# Patient Record
Sex: FEMALE | Race: OTHER | HISPANIC OR LATINO
[De-identification: names, ages, dates, MRNs, and addresses within clinical notes are randomized per-mention and may not be internally consistent; named-entity substitution may affect disease eponyms.]

---

## 2017-04-03 ENCOUNTER — APPOINTMENT (OUTPATIENT)
Dept: MRI IMAGING | Facility: CLINIC | Age: 57
End: 2017-04-03

## 2017-04-03 ENCOUNTER — OUTPATIENT (OUTPATIENT)
Dept: OUTPATIENT SERVICES | Facility: HOSPITAL | Age: 57
LOS: 1 days | End: 2017-04-03
Payer: MEDICAID

## 2017-04-03 DIAGNOSIS — Z98.89 OTHER SPECIFIED POSTPROCEDURAL STATES: Chronic | ICD-10-CM

## 2017-04-03 DIAGNOSIS — I62.9 NONTRAUMATIC INTRACRANIAL HEMORRHAGE, UNSPECIFIED: ICD-10-CM

## 2017-04-03 PROCEDURE — 70551 MRI BRAIN STEM W/O DYE: CPT

## 2017-12-04 ENCOUNTER — OUTPATIENT (OUTPATIENT)
Dept: OUTPATIENT SERVICES | Facility: HOSPITAL | Age: 57
LOS: 1 days | Discharge: HOME | End: 2017-12-04
Payer: MEDICAID

## 2017-12-04 VITALS
OXYGEN SATURATION: 98 % | TEMPERATURE: 98 F | HEART RATE: 81 BPM | RESPIRATION RATE: 81 BRPM | WEIGHT: 147.93 LBS | HEIGHT: 60 IN | SYSTOLIC BLOOD PRESSURE: 162 MMHG | DIASTOLIC BLOOD PRESSURE: 79 MMHG

## 2017-12-04 DIAGNOSIS — Z98.891 HISTORY OF UTERINE SCAR FROM PREVIOUS SURGERY: Chronic | ICD-10-CM

## 2017-12-04 DIAGNOSIS — M75.92 SHOULDER LESION, UNSPECIFIED, LEFT SHOULDER: ICD-10-CM

## 2017-12-04 DIAGNOSIS — Z90.710 ACQUIRED ABSENCE OF BOTH CERVIX AND UTERUS: Chronic | ICD-10-CM

## 2017-12-04 DIAGNOSIS — Z98.89 OTHER SPECIFIED POSTPROCEDURAL STATES: Chronic | ICD-10-CM

## 2017-12-04 LAB
ANION GAP SERPL CALC-SCNC: 5 MMOL/L — SIGNIFICANT CHANGE UP (ref 5–17)
BASOPHILS # BLD AUTO: 0.1 K/UL — SIGNIFICANT CHANGE UP (ref 0–0.2)
BASOPHILS NFR BLD AUTO: 1.4 % — SIGNIFICANT CHANGE UP (ref 0–2)
BUN SERPL-MCNC: 8 MG/DL — SIGNIFICANT CHANGE UP (ref 7–23)
CALCIUM SERPL-MCNC: 9.1 MG/DL — SIGNIFICANT CHANGE UP (ref 8.5–10.1)
CHLORIDE SERPL-SCNC: 106 MMOL/L — SIGNIFICANT CHANGE UP (ref 96–108)
CO2 SERPL-SCNC: 30 MMOL/L — SIGNIFICANT CHANGE UP (ref 22–31)
CREAT SERPL-MCNC: 0.56 MG/DL — SIGNIFICANT CHANGE UP (ref 0.5–1.3)
EOSINOPHIL # BLD AUTO: 0.4 K/UL — SIGNIFICANT CHANGE UP (ref 0–0.5)
EOSINOPHIL NFR BLD AUTO: 7.7 % — HIGH (ref 0–6)
GLUCOSE SERPL-MCNC: 136 MG/DL — HIGH (ref 70–99)
HCT VFR BLD CALC: 39.2 % — SIGNIFICANT CHANGE UP (ref 34.5–45)
HGB BLD-MCNC: 13.1 G/DL — SIGNIFICANT CHANGE UP (ref 11.5–15.5)
LYMPHOCYTES # BLD AUTO: 1.4 K/UL — SIGNIFICANT CHANGE UP (ref 1–3.3)
LYMPHOCYTES # BLD AUTO: 27.4 % — SIGNIFICANT CHANGE UP (ref 13–44)
MCHC RBC-ENTMCNC: 28.6 PG — SIGNIFICANT CHANGE UP (ref 27–34)
MCHC RBC-ENTMCNC: 33.4 GM/DL — SIGNIFICANT CHANGE UP (ref 32–36)
MCV RBC AUTO: 85.9 FL — SIGNIFICANT CHANGE UP (ref 80–100)
MONOCYTES # BLD AUTO: 0.4 K/UL — SIGNIFICANT CHANGE UP (ref 0–0.9)
MONOCYTES NFR BLD AUTO: 7.2 % — SIGNIFICANT CHANGE UP (ref 2–14)
NEUTROPHILS # BLD AUTO: 2.8 K/UL — SIGNIFICANT CHANGE UP (ref 1.8–7.4)
NEUTROPHILS NFR BLD AUTO: 56.2 % — SIGNIFICANT CHANGE UP (ref 43–77)
PLATELET # BLD AUTO: 216 K/UL — SIGNIFICANT CHANGE UP (ref 150–400)
POTASSIUM SERPL-MCNC: 4 MMOL/L — SIGNIFICANT CHANGE UP (ref 3.5–5.3)
POTASSIUM SERPL-SCNC: 4 MMOL/L — SIGNIFICANT CHANGE UP (ref 3.5–5.3)
RBC # BLD: 4.56 M/UL — SIGNIFICANT CHANGE UP (ref 3.8–5.2)
RBC # FLD: 12.2 % — SIGNIFICANT CHANGE UP (ref 10.3–14.5)
SODIUM SERPL-SCNC: 141 MMOL/L — SIGNIFICANT CHANGE UP (ref 135–145)
WBC # BLD: 5 K/UL — SIGNIFICANT CHANGE UP (ref 3.8–10.5)
WBC # FLD AUTO: 5 K/UL — SIGNIFICANT CHANGE UP (ref 3.8–10.5)

## 2017-12-04 PROCEDURE — 93010 ELECTROCARDIOGRAM REPORT: CPT

## 2017-12-04 NOTE — H&P PST ADULT - ASSESSMENT
58 y/o female with left shoulder impingement. Complain of chronic left shoulder pain. Scheduled for left shoulder arthroscopy with Dr. Matta.    Plan  1. Stop all NSAIDS, herbal supplements and vitamins for 7 days.  2. NPO at midnight.  3. Take the following medications (losartan) with small sips of water on the morning of your procedure/surgery.  4. Use EZ sponges as directed

## 2017-12-04 NOTE — H&P PST ADULT - NSANTHOSAYNRD_GEN_A_CORE
No. KALYAN screening performed.  STOP BANG Legend: 0-2 = LOW Risk; 3-4 = INTERMEDIATE Risk; 5-8 = HIGH Risk

## 2017-12-04 NOTE — H&P PST ADULT - HISTORY OF PRESENT ILLNESS
58 y/o female with left shoulder impingement. Complain of chronic left shoulder pain. Here today for PST for left shoulder arthroscopy.

## 2017-12-04 NOTE — H&P PST ADULT - FAMILY HISTORY
Mother  Still living? Unknown  Family history of colon cancer, Age at diagnosis: Age Unknown     Father  Still living? Unknown  Family history of heart disease, Age at diagnosis: Age Unknown

## 2017-12-11 ENCOUNTER — RESULT REVIEW (OUTPATIENT)
Age: 57
End: 2017-12-11

## 2017-12-11 ENCOUNTER — OUTPATIENT (OUTPATIENT)
Dept: OUTPATIENT SERVICES | Facility: HOSPITAL | Age: 57
LOS: 1 days | Discharge: HOME | End: 2017-12-11
Payer: MEDICAID

## 2017-12-11 VITALS
SYSTOLIC BLOOD PRESSURE: 108 MMHG | TEMPERATURE: 98 F | OXYGEN SATURATION: 97 % | HEART RATE: 72 BPM | DIASTOLIC BLOOD PRESSURE: 65 MMHG | RESPIRATION RATE: 15 BRPM

## 2017-12-11 VITALS
RESPIRATION RATE: 15 BRPM | HEIGHT: 60 IN | OXYGEN SATURATION: 99 % | SYSTOLIC BLOOD PRESSURE: 151 MMHG | WEIGHT: 147.71 LBS | TEMPERATURE: 98 F | HEART RATE: 79 BPM | DIASTOLIC BLOOD PRESSURE: 80 MMHG

## 2017-12-11 DIAGNOSIS — Z90.710 ACQUIRED ABSENCE OF BOTH CERVIX AND UTERUS: Chronic | ICD-10-CM

## 2017-12-11 DIAGNOSIS — Z98.89 OTHER SPECIFIED POSTPROCEDURAL STATES: Chronic | ICD-10-CM

## 2017-12-11 DIAGNOSIS — Z98.891 HISTORY OF UTERINE SCAR FROM PREVIOUS SURGERY: Chronic | ICD-10-CM

## 2017-12-11 PROCEDURE — 88304 TISSUE EXAM BY PATHOLOGIST: CPT | Mod: 26

## 2017-12-11 RX ORDER — ONDANSETRON 8 MG/1
4 TABLET, FILM COATED ORAL ONCE
Qty: 0 | Refills: 0 | Status: COMPLETED | OUTPATIENT
Start: 2017-12-11 | End: 2017-12-11

## 2017-12-11 RX ORDER — ONDANSETRON 8 MG/1
4 TABLET, FILM COATED ORAL ONCE
Qty: 0 | Refills: 0 | Status: DISCONTINUED | OUTPATIENT
Start: 2017-12-11 | End: 2017-12-11

## 2017-12-11 RX ORDER — FENTANYL CITRATE 50 UG/ML
50 INJECTION INTRAVENOUS
Qty: 0 | Refills: 0 | Status: DISCONTINUED | OUTPATIENT
Start: 2017-12-11 | End: 2017-12-11

## 2017-12-11 RX ORDER — OXYCODONE HYDROCHLORIDE 5 MG/1
10 TABLET ORAL ONCE
Qty: 0 | Refills: 0 | Status: DISCONTINUED | OUTPATIENT
Start: 2017-12-11 | End: 2017-12-11

## 2017-12-11 RX ORDER — OXYCODONE HYDROCHLORIDE 5 MG/1
5 TABLET ORAL EVERY 4 HOURS
Qty: 0 | Refills: 0 | Status: DISCONTINUED | OUTPATIENT
Start: 2017-12-11 | End: 2017-12-11

## 2017-12-11 RX ORDER — FAMOTIDINE 10 MG/ML
20 INJECTION INTRAVENOUS ONCE
Qty: 0 | Refills: 0 | Status: COMPLETED | OUTPATIENT
Start: 2017-12-11 | End: 2017-12-11

## 2017-12-11 RX ORDER — CELECOXIB 200 MG/1
200 CAPSULE ORAL ONCE
Qty: 0 | Refills: 0 | Status: COMPLETED | OUTPATIENT
Start: 2017-12-11 | End: 2017-12-11

## 2017-12-11 RX ORDER — ACETAMINOPHEN 500 MG
975 TABLET ORAL ONCE
Qty: 0 | Refills: 0 | Status: COMPLETED | OUTPATIENT
Start: 2017-12-11 | End: 2017-12-11

## 2017-12-11 RX ORDER — SODIUM CHLORIDE 9 MG/ML
1000 INJECTION INTRAMUSCULAR; INTRAVENOUS; SUBCUTANEOUS
Qty: 0 | Refills: 0 | Status: DISCONTINUED | OUTPATIENT
Start: 2017-12-11 | End: 2017-12-11

## 2017-12-11 RX ORDER — SODIUM CHLORIDE 9 MG/ML
3 INJECTION INTRAMUSCULAR; INTRAVENOUS; SUBCUTANEOUS EVERY 8 HOURS
Qty: 0 | Refills: 0 | Status: DISCONTINUED | OUTPATIENT
Start: 2017-12-11 | End: 2017-12-11

## 2017-12-11 RX ADMIN — FAMOTIDINE 20 MILLIGRAM(S): 10 INJECTION INTRAVENOUS at 13:21

## 2017-12-11 RX ADMIN — Medication 975 MILLIGRAM(S): at 13:21

## 2017-12-11 RX ADMIN — CELECOXIB 200 MILLIGRAM(S): 200 CAPSULE ORAL at 13:20

## 2017-12-11 RX ADMIN — ONDANSETRON 4 MILLIGRAM(S): 8 TABLET, FILM COATED ORAL at 17:12

## 2017-12-11 NOTE — BRIEF OPERATIVE NOTE - OPERATION/FINDINGS
procedure: left shoudler rotator cuff repair, labral debridement, biceps debridement, chondroplasty, subacromial decompression, acrmoioplasty, distal clavicle excision  see full op note dictatino

## 2017-12-11 NOTE — BRIEF OPERATIVE NOTE - PROCEDURE
<<-----Click on this checkbox to enter Procedure Rotator cuff repair  12/11/2017  left jerri rotator cuff repair, labral debridement, biceps debridement, chondroplasty, subacromial decompression, acrmoioplasty, distal clavicle excision  Active  NOLSON

## 2017-12-11 NOTE — ASU DISCHARGE PLAN (ADULT/PEDIATRIC). - NOTIFY
Pain not relieved by Medications/Fever greater than 101/Numbness, color, or temperature change to extremity/Bleeding that does not stop

## 2017-12-11 NOTE — ASU DISCHARGE PLAN (ADULT/PEDIATRIC). - MEDICATION SUMMARY - MEDICATIONS TO TAKE
I will START or STAY ON the medications listed below when I get home from the hospital:    oxyCODONE-acetaminophen 5 mg-325 mg oral tablet  -- 1 tab(s) by mouth every 4 hours, As Needed -for severe pain MDD:6  -- Caution federal law prohibits the transfer of this drug to any person other  than the person for whom it was prescribed.  May cause drowsiness.  Alcohol may intensify this effect.  Use care when operating dangerous machinery.  This prescription cannot be refilled.  This product contains acetaminophen.  Do not use  with any other product containing acetaminophen to prevent possible liver damage.  Using more of this medication than prescribed may cause serious breathing problems.    -- Indication: For pain    losartan 25 mg oral tablet  -- 1 tab(s) by mouth once a day  -- Indication: For home med    metFORMIN 1000 mg oral tablet  -- 1 tab(s) by mouth 2 times a day  -- Indication: For home med    glipiZIDE 10 mg oral tablet  -- 1 tab(s) by mouth 2 times a day  -- Indication: For home med    Toujeo SoloStar 300 units/mL subcutaneous solution  -- 40 unit(s) subcutaneous once a day (at bedtime)  -- Indication: For home med    atorvastatin 10 mg oral tablet  -- 1 tab(s) by mouth once a day  -- Indication: For home med

## 2017-12-11 NOTE — ASU DISCHARGE PLAN (ADULT/PEDIATRIC). - SPECIAL INSTRUCTIONS
Follow up with Dr Matta in 7-10 days. Call office for appointment. Take medications as prescribed. Keep dressing clean, dry, and intact. Rest, ice, and elevate affected extremity. non weight bearing left upper extremity in shoulder immobilizer

## 2017-12-13 LAB — SURGICAL PATHOLOGY FINAL REPORT - CH: SIGNIFICANT CHANGE UP

## 2017-12-20 DIAGNOSIS — M75.42 IMPINGEMENT SYNDROME OF LEFT SHOULDER: ICD-10-CM

## 2017-12-20 DIAGNOSIS — E55.9 VITAMIN D DEFICIENCY, UNSPECIFIED: ICD-10-CM

## 2017-12-20 DIAGNOSIS — M75.122 COMPLETE ROTATOR CUFF TEAR OR RUPTURE OF LEFT SHOULDER, NOT SPECIFIED AS TRAUMATIC: ICD-10-CM

## 2017-12-20 DIAGNOSIS — M65.812 OTHER SYNOVITIS AND TENOSYNOVITIS, LEFT SHOULDER: ICD-10-CM

## 2017-12-20 DIAGNOSIS — Z90.710 ACQUIRED ABSENCE OF BOTH CERVIX AND UTERUS: ICD-10-CM

## 2017-12-20 DIAGNOSIS — M19.012 PRIMARY OSTEOARTHRITIS, LEFT SHOULDER: ICD-10-CM

## 2017-12-20 DIAGNOSIS — M75.52 BURSITIS OF LEFT SHOULDER: ICD-10-CM

## 2017-12-20 DIAGNOSIS — I10 ESSENTIAL (PRIMARY) HYPERTENSION: ICD-10-CM

## 2017-12-20 DIAGNOSIS — Z79.84 LONG TERM (CURRENT) USE OF ORAL HYPOGLYCEMIC DRUGS: ICD-10-CM

## 2017-12-20 DIAGNOSIS — E11.9 TYPE 2 DIABETES MELLITUS WITHOUT COMPLICATIONS: ICD-10-CM

## 2017-12-20 DIAGNOSIS — Z86.73 PERSONAL HISTORY OF TRANSIENT ISCHEMIC ATTACK (TIA), AND CEREBRAL INFARCTION WITHOUT RESIDUAL DEFICITS: ICD-10-CM

## 2017-12-20 DIAGNOSIS — M11.212 OTHER CHONDROCALCINOSIS, LEFT SHOULDER: ICD-10-CM

## 2017-12-20 DIAGNOSIS — M24.812 OTHER SPECIFIC JOINT DERANGEMENTS OF LEFT SHOULDER, NOT ELSEWHERE CLASSIFIED: ICD-10-CM

## 2017-12-20 DIAGNOSIS — M94.212 CHONDROMALACIA, LEFT SHOULDER: ICD-10-CM

## 2017-12-20 DIAGNOSIS — E02 SUBCLINICAL IODINE-DEFICIENCY HYPOTHYROIDISM: ICD-10-CM

## 2018-07-30 ENCOUNTER — INPATIENT (INPATIENT)
Facility: HOSPITAL | Age: 58
LOS: 0 days | Discharge: HOME | End: 2018-07-31
Attending: INTERNAL MEDICINE | Admitting: INTERNAL MEDICINE
Payer: MEDICAID

## 2018-07-30 VITALS — HEIGHT: 60 IN | WEIGHT: 160.06 LBS

## 2018-07-30 DIAGNOSIS — Z98.89 OTHER SPECIFIED POSTPROCEDURAL STATES: Chronic | ICD-10-CM

## 2018-07-30 DIAGNOSIS — M67.912 UNSPECIFIED DISORDER OF SYNOVIUM AND TENDON, LEFT SHOULDER: Chronic | ICD-10-CM

## 2018-07-30 DIAGNOSIS — M79.1 MYALGIA: ICD-10-CM

## 2018-07-30 DIAGNOSIS — Z90.710 ACQUIRED ABSENCE OF BOTH CERVIX AND UTERUS: Chronic | ICD-10-CM

## 2018-07-30 DIAGNOSIS — Z98.891 HISTORY OF UTERINE SCAR FROM PREVIOUS SURGERY: Chronic | ICD-10-CM

## 2018-07-30 PROBLEM — M75.42 IMPINGEMENT SYNDROME OF LEFT SHOULDER: Chronic | Status: ACTIVE | Noted: 2017-12-04

## 2018-07-30 PROBLEM — I10 ESSENTIAL (PRIMARY) HYPERTENSION: Chronic | Status: ACTIVE | Noted: 2017-12-04

## 2018-07-30 LAB
ALBUMIN SERPL ELPH-MCNC: 3.6 G/DL — SIGNIFICANT CHANGE UP (ref 3.3–5)
ALP SERPL-CCNC: 86 U/L — SIGNIFICANT CHANGE UP (ref 40–120)
ALT FLD-CCNC: 33 U/L — SIGNIFICANT CHANGE UP (ref 12–78)
ANION GAP SERPL CALC-SCNC: 7 MMOL/L — SIGNIFICANT CHANGE UP (ref 5–17)
APTT BLD: 24.9 SEC — LOW (ref 27.5–37.4)
AST SERPL-CCNC: 19 U/L — SIGNIFICANT CHANGE UP (ref 15–37)
BASOPHILS # BLD AUTO: 0.02 K/UL — SIGNIFICANT CHANGE UP (ref 0–0.2)
BASOPHILS NFR BLD AUTO: 0.4 % — SIGNIFICANT CHANGE UP (ref 0–2)
BILIRUB SERPL-MCNC: 0.8 MG/DL — SIGNIFICANT CHANGE UP (ref 0.2–1.2)
BUN SERPL-MCNC: 8 MG/DL — SIGNIFICANT CHANGE UP (ref 7–23)
CALCIUM SERPL-MCNC: 8.9 MG/DL — SIGNIFICANT CHANGE UP (ref 8.5–10.1)
CHLORIDE SERPL-SCNC: 107 MMOL/L — SIGNIFICANT CHANGE UP (ref 96–108)
CK SERPL-CCNC: 298 U/L — HIGH (ref 26–192)
CO2 SERPL-SCNC: 28 MMOL/L — SIGNIFICANT CHANGE UP (ref 22–31)
CREAT SERPL-MCNC: 0.68 MG/DL — SIGNIFICANT CHANGE UP (ref 0.5–1.3)
EOSINOPHIL # BLD AUTO: 0.34 K/UL — SIGNIFICANT CHANGE UP (ref 0–0.5)
EOSINOPHIL NFR BLD AUTO: 6.4 % — HIGH (ref 0–6)
GLUCOSE BLDC GLUCOMTR-MCNC: 141 MG/DL — HIGH (ref 70–99)
GLUCOSE BLDC GLUCOMTR-MCNC: 146 MG/DL — HIGH (ref 70–99)
GLUCOSE BLDC GLUCOMTR-MCNC: 153 MG/DL — HIGH (ref 70–99)
GLUCOSE SERPL-MCNC: 151 MG/DL — HIGH (ref 70–99)
HCT VFR BLD CALC: 35.5 % — SIGNIFICANT CHANGE UP (ref 34.5–45)
HGB BLD-MCNC: 12 G/DL — SIGNIFICANT CHANGE UP (ref 11.5–15.5)
IMM GRANULOCYTES NFR BLD AUTO: 0.4 % — SIGNIFICANT CHANGE UP (ref 0–1.5)
INR BLD: 0.96 RATIO — SIGNIFICANT CHANGE UP (ref 0.88–1.16)
LIDOCAIN IGE QN: 188 U/L — SIGNIFICANT CHANGE UP (ref 73–393)
LYMPHOCYTES # BLD AUTO: 1.61 K/UL — SIGNIFICANT CHANGE UP (ref 1–3.3)
LYMPHOCYTES # BLD AUTO: 30.1 % — SIGNIFICANT CHANGE UP (ref 13–44)
MCHC RBC-ENTMCNC: 29.2 PG — SIGNIFICANT CHANGE UP (ref 27–34)
MCHC RBC-ENTMCNC: 33.8 GM/DL — SIGNIFICANT CHANGE UP (ref 32–36)
MCV RBC AUTO: 86.4 FL — SIGNIFICANT CHANGE UP (ref 80–100)
MONOCYTES # BLD AUTO: 0.33 K/UL — SIGNIFICANT CHANGE UP (ref 0–0.9)
MONOCYTES NFR BLD AUTO: 6.2 % — SIGNIFICANT CHANGE UP (ref 2–14)
NEUTROPHILS # BLD AUTO: 3.03 K/UL — SIGNIFICANT CHANGE UP (ref 1.8–7.4)
NEUTROPHILS NFR BLD AUTO: 56.5 % — SIGNIFICANT CHANGE UP (ref 43–77)
NRBC # BLD: 0 /100 WBCS — SIGNIFICANT CHANGE UP (ref 0–0)
PLATELET # BLD AUTO: 207 K/UL — SIGNIFICANT CHANGE UP (ref 150–400)
POTASSIUM SERPL-MCNC: 4 MMOL/L — SIGNIFICANT CHANGE UP (ref 3.5–5.3)
POTASSIUM SERPL-SCNC: 4 MMOL/L — SIGNIFICANT CHANGE UP (ref 3.5–5.3)
PROT SERPL-MCNC: 7.5 GM/DL — SIGNIFICANT CHANGE UP (ref 6–8.3)
PROTHROM AB SERPL-ACNC: 10.4 SEC — SIGNIFICANT CHANGE UP (ref 9.8–12.7)
RBC # BLD: 4.11 M/UL — SIGNIFICANT CHANGE UP (ref 3.8–5.2)
RBC # FLD: 13.3 % — SIGNIFICANT CHANGE UP (ref 10.3–14.5)
SODIUM SERPL-SCNC: 142 MMOL/L — SIGNIFICANT CHANGE UP (ref 135–145)
TROPONIN I SERPL-MCNC: <0.015 NG/ML — SIGNIFICANT CHANGE UP (ref 0.01–0.04)
WBC # BLD: 5.35 K/UL — SIGNIFICANT CHANGE UP (ref 3.8–10.5)
WBC # FLD AUTO: 5.35 K/UL — SIGNIFICANT CHANGE UP (ref 3.8–10.5)

## 2018-07-30 PROCEDURE — 71045 X-RAY EXAM CHEST 1 VIEW: CPT | Mod: 26

## 2018-07-30 PROCEDURE — 99285 EMERGENCY DEPT VISIT HI MDM: CPT

## 2018-07-30 PROCEDURE — 93010 ELECTROCARDIOGRAM REPORT: CPT

## 2018-07-30 RX ORDER — PANTOPRAZOLE SODIUM 20 MG/1
40 TABLET, DELAYED RELEASE ORAL
Qty: 0 | Refills: 0 | Status: DISCONTINUED | OUTPATIENT
Start: 2018-07-30 | End: 2018-07-31

## 2018-07-30 RX ORDER — METFORMIN HYDROCHLORIDE 850 MG/1
1 TABLET ORAL
Qty: 0 | Refills: 0 | COMMUNITY

## 2018-07-30 RX ORDER — DOCUSATE SODIUM 100 MG
100 CAPSULE ORAL THREE TIMES A DAY
Qty: 0 | Refills: 0 | Status: DISCONTINUED | OUTPATIENT
Start: 2018-07-30 | End: 2018-07-31

## 2018-07-30 RX ORDER — INSULIN GLARGINE 100 [IU]/ML
40 INJECTION, SOLUTION SUBCUTANEOUS AT BEDTIME
Qty: 0 | Refills: 0 | Status: DISCONTINUED | OUTPATIENT
Start: 2018-07-30 | End: 2018-07-31

## 2018-07-30 RX ORDER — DEXTROSE 50 % IN WATER 50 %
15 SYRINGE (ML) INTRAVENOUS ONCE
Qty: 0 | Refills: 0 | Status: DISCONTINUED | OUTPATIENT
Start: 2018-07-30 | End: 2018-07-31

## 2018-07-30 RX ORDER — INSULIN GLARGINE 100 [IU]/ML
40 INJECTION, SOLUTION SUBCUTANEOUS
Qty: 0 | Refills: 0 | COMMUNITY

## 2018-07-30 RX ORDER — LOSARTAN POTASSIUM 100 MG/1
1 TABLET, FILM COATED ORAL
Qty: 0 | Refills: 0 | COMMUNITY

## 2018-07-30 RX ORDER — GLUCAGON INJECTION, SOLUTION 0.5 MG/.1ML
1 INJECTION, SOLUTION SUBCUTANEOUS ONCE
Qty: 0 | Refills: 0 | Status: DISCONTINUED | OUTPATIENT
Start: 2018-07-30 | End: 2018-07-31

## 2018-07-30 RX ORDER — ONDANSETRON 8 MG/1
4 TABLET, FILM COATED ORAL EVERY 6 HOURS
Qty: 0 | Refills: 0 | Status: DISCONTINUED | OUTPATIENT
Start: 2018-07-30 | End: 2018-07-31

## 2018-07-30 RX ORDER — LOSARTAN POTASSIUM 100 MG/1
25 TABLET, FILM COATED ORAL DAILY
Qty: 0 | Refills: 0 | Status: DISCONTINUED | OUTPATIENT
Start: 2018-07-30 | End: 2018-07-31

## 2018-07-30 RX ORDER — ATORVASTATIN CALCIUM 80 MG/1
1 TABLET, FILM COATED ORAL
Qty: 0 | Refills: 0 | COMMUNITY

## 2018-07-30 RX ORDER — ATORVASTATIN CALCIUM 80 MG/1
10 TABLET, FILM COATED ORAL AT BEDTIME
Qty: 0 | Refills: 0 | Status: DISCONTINUED | OUTPATIENT
Start: 2018-07-30 | End: 2018-07-31

## 2018-07-30 RX ORDER — KETOROLAC TROMETHAMINE 30 MG/ML
15 SYRINGE (ML) INJECTION EVERY 6 HOURS
Qty: 0 | Refills: 0 | Status: DISCONTINUED | OUTPATIENT
Start: 2018-07-30 | End: 2018-07-31

## 2018-07-30 RX ORDER — SODIUM CHLORIDE 9 MG/ML
1000 INJECTION, SOLUTION INTRAVENOUS
Qty: 0 | Refills: 0 | Status: DISCONTINUED | OUTPATIENT
Start: 2018-07-30 | End: 2018-07-31

## 2018-07-30 RX ORDER — ENOXAPARIN SODIUM 100 MG/ML
40 INJECTION SUBCUTANEOUS EVERY 24 HOURS
Qty: 0 | Refills: 0 | Status: DISCONTINUED | OUTPATIENT
Start: 2018-07-30 | End: 2018-07-31

## 2018-07-30 RX ADMIN — Medication 15 MILLIGRAM(S): at 17:48

## 2018-07-30 RX ADMIN — Medication 100 MILLIGRAM(S): at 21:47

## 2018-07-30 RX ADMIN — ENOXAPARIN SODIUM 40 MILLIGRAM(S): 100 INJECTION SUBCUTANEOUS at 18:32

## 2018-07-30 RX ADMIN — PANTOPRAZOLE SODIUM 40 MILLIGRAM(S): 20 TABLET, DELAYED RELEASE ORAL at 18:32

## 2018-07-30 RX ADMIN — ATORVASTATIN CALCIUM 10 MILLIGRAM(S): 80 TABLET, FILM COATED ORAL at 21:47

## 2018-07-30 NOTE — H&P ADULT - NSHPLABSRESULTS_GEN_ALL_CORE
12.0   5.35  )-----------( 207      ( 30 Jul 2018 12:29 )             35.5     30 Jul 2018 12:29    142    |  107    |  8      ----------------------------<  151    4.0     |  28     |  0.68     Ca    8.9        30 Jul 2018 12:29    TPro  7.5    /  Alb  3.6    /  TBili  0.8    /  DBili  x      /  AST  19     /  ALT  33     /  AlkPhos  86     30 Jul 2018 12:29    LIVER FUNCTIONS - ( 30 Jul 2018 12:29 )  Alb: 3.6 g/dL / Pro: 7.5 gm/dL / ALK PHOS: 86 U/L / ALT: 33 U/L / AST: 19 U/L / GGT: x           PT/INR - ( 30 Jul 2018 12:29 )   PT: 10.4 sec;   INR: 0.96 ratio       PTT - ( 30 Jul 2018 12:29 )  PTT:24.9 sec    POCT Blood Glucose.: 153 mg/dL (30 Jul 2018 12:27)  CARDIAC MARKERS ( 30 Jul 2018 12:29 )  <0.015 ng/mL / x     / 298 U/L / x     / x

## 2018-07-30 NOTE — ED STATDOCS - OBJECTIVE STATEMENT
57 y/o female with a PMHx of HTN, DM, CVA, HLD presents to the ED c/o CP and SOB x3 days radiating to shoulder. Pt saw PMD today- was given ASA and was sent into ED. SOB is worsened with bending forward. +chills, cough. Denies fever. Cardiologist- Dr. Fofana PMD- Dr. Davila

## 2018-07-30 NOTE — H&P ADULT - NSHPPHYSICALEXAM_GEN_ALL_CORE
Vital Signs Last 24 Hrs  T(C): 37 (30 Jul 2018 11:17), Max: 37 (30 Jul 2018 11:17)  T(F): 98.6 (30 Jul 2018 11:17), Max: 98.6 (30 Jul 2018 11:17)  HR: 65 (30 Jul 2018 11:17) (65 - 65)  BP: 142/76 (30 Jul 2018 11:17) (142/76 - 142/76)  BP(mean): --  RR: 18 (30 Jul 2018 11:17) (18 - 18)  SpO2: 99% (30 Jul 2018 11:17) (99% - 99%)

## 2018-07-30 NOTE — ED STATDOCS - NS_ ATTENDINGSCRIBEDETAILS _ED_A_ED_FT
Darrel Mcclain DO (Attending): The history, relevant review of systems, past medical and surgical history, medical decision making, and physical examination was documented by the scribe in my presence and I attest to the accuracy of the documentation.

## 2018-07-30 NOTE — ED STATDOCS - PMH
Cerebrovascular accident    Diabetes    HLD (hyperlipidemia)    HTN (hypertension)    Shoulder impingement, left

## 2018-07-30 NOTE — ED STATDOCS - PROGRESS NOTE DETAILS
KIMBERLY Johnson:   Patient has been seen, evaluated and orders have been written by the attending in intake. Patient is stable.  I will follow up the results of orders written and I will continue to evaluate/observe the patient.  Pt. with heart score 4, CP and SOB x 3 days.  Admission for cardiology workup.  Leydi Johnson PA-C

## 2018-07-30 NOTE — H&P ADULT - HISTORY OF PRESENT ILLNESS
57yo/F with PMH HTN, hyperlipidemia, diabetes presented for evaluation of chest pain, substernal and across the chest, constant, worse on movement and deep inspiration, radiating to LT shoulder, no SOB, no N/V, no diaphoresis

## 2018-07-30 NOTE — H&P ADULT - PSH
Disorder of left rotator cuff    H/O:  section  2x  H/O: hysterectomy  2003  History of cholecystectomy  25 yrs ago

## 2018-07-30 NOTE — ED STATDOCS - ATTENDING CONTRIBUTION TO CARE
I, Darrel Mcclain DO,  performed the initial face to face bedside interview with this patient regarding history of present illness, review of symptoms and relevant past medical, social and family history.  I completed an independent physical examination.  I was the initial provider who evaluated this patient. I have signed out the follow up of any pending tests (i.e. labs, radiological studies) to the ACP.  I have communicated the patient’s plan of care and disposition with the ACP.

## 2018-07-30 NOTE — H&P ADULT - ASSESSMENT
#Chest pain  Likely atypical/ musculoskeletal  Admit to tele  CEx3  Cardio eval DR Verduzco  Aspirin, statin  ECHO  Toradol for pain prn    #HTN/hyperlipidemia/Diabetes  ISS  Cont home meds    #Dispo- tele admit

## 2018-07-30 NOTE — ED STATDOCS - MEDICAL DECISION MAKING DETAILS
57 y/o female with CP. EKG unremarkable, however hx of DM, HTN, HLD, CVA. Heart score 5 prior to troponin. Plans for labs, EKG, tele monitoring, and admission.

## 2018-07-31 ENCOUNTER — TRANSCRIPTION ENCOUNTER (OUTPATIENT)
Age: 58
End: 2018-07-31

## 2018-07-31 VITALS
HEART RATE: 61 BPM | SYSTOLIC BLOOD PRESSURE: 132 MMHG | TEMPERATURE: 97 F | RESPIRATION RATE: 18 BRPM | OXYGEN SATURATION: 98 % | DIASTOLIC BLOOD PRESSURE: 72 MMHG

## 2018-07-31 DIAGNOSIS — R07.9 CHEST PAIN, UNSPECIFIED: ICD-10-CM

## 2018-07-31 DIAGNOSIS — I10 ESSENTIAL (PRIMARY) HYPERTENSION: ICD-10-CM

## 2018-07-31 LAB
ANION GAP SERPL CALC-SCNC: 6 MMOL/L — SIGNIFICANT CHANGE UP (ref 5–17)
BUN SERPL-MCNC: 17 MG/DL — SIGNIFICANT CHANGE UP (ref 7–23)
CALCIUM SERPL-MCNC: 9.1 MG/DL — SIGNIFICANT CHANGE UP (ref 8.5–10.1)
CHLORIDE SERPL-SCNC: 110 MMOL/L — HIGH (ref 96–108)
CK SERPL-CCNC: 203 U/L — HIGH (ref 26–192)
CO2 SERPL-SCNC: 28 MMOL/L — SIGNIFICANT CHANGE UP (ref 22–31)
CREAT SERPL-MCNC: 0.71 MG/DL — SIGNIFICANT CHANGE UP (ref 0.5–1.3)
GLUCOSE BLDC GLUCOMTR-MCNC: 123 MG/DL — HIGH (ref 70–99)
GLUCOSE BLDC GLUCOMTR-MCNC: 176 MG/DL — HIGH (ref 70–99)
GLUCOSE SERPL-MCNC: 115 MG/DL — HIGH (ref 70–99)
HBA1C BLD-MCNC: 9.5 % — HIGH (ref 4–5.6)
HCT VFR BLD CALC: 38.9 % — SIGNIFICANT CHANGE UP (ref 34.5–45)
HGB BLD-MCNC: 12.8 G/DL — SIGNIFICANT CHANGE UP (ref 11.5–15.5)
MCHC RBC-ENTMCNC: 28.8 PG — SIGNIFICANT CHANGE UP (ref 27–34)
MCHC RBC-ENTMCNC: 32.9 GM/DL — SIGNIFICANT CHANGE UP (ref 32–36)
MCV RBC AUTO: 87.6 FL — SIGNIFICANT CHANGE UP (ref 80–100)
NRBC # BLD: 0 /100 WBCS — SIGNIFICANT CHANGE UP (ref 0–0)
PLATELET # BLD AUTO: 219 K/UL — SIGNIFICANT CHANGE UP (ref 150–400)
POTASSIUM SERPL-MCNC: 4.3 MMOL/L — SIGNIFICANT CHANGE UP (ref 3.5–5.3)
POTASSIUM SERPL-SCNC: 4.3 MMOL/L — SIGNIFICANT CHANGE UP (ref 3.5–5.3)
RBC # BLD: 4.44 M/UL — SIGNIFICANT CHANGE UP (ref 3.8–5.2)
RBC # FLD: 13.3 % — SIGNIFICANT CHANGE UP (ref 10.3–14.5)
SODIUM SERPL-SCNC: 144 MMOL/L — SIGNIFICANT CHANGE UP (ref 135–145)
WBC # BLD: 5.89 K/UL — SIGNIFICANT CHANGE UP (ref 3.8–10.5)
WBC # FLD AUTO: 5.89 K/UL — SIGNIFICANT CHANGE UP (ref 3.8–10.5)

## 2018-07-31 PROCEDURE — 93018 CV STRESS TEST I&R ONLY: CPT

## 2018-07-31 PROCEDURE — 78452 HT MUSCLE IMAGE SPECT MULT: CPT | Mod: 26

## 2018-07-31 PROCEDURE — 99222 1ST HOSP IP/OBS MODERATE 55: CPT

## 2018-07-31 PROCEDURE — 93016 CV STRESS TEST SUPVJ ONLY: CPT

## 2018-07-31 RX ORDER — REGADENOSON 0.08 MG/ML
0.4 INJECTION, SOLUTION INTRAVENOUS ONCE
Qty: 0 | Refills: 0 | Status: COMPLETED | OUTPATIENT
Start: 2018-07-31 | End: 2018-07-31

## 2018-07-31 RX ORDER — ACETAMINOPHEN 500 MG
650 TABLET ORAL ONCE
Qty: 0 | Refills: 0 | Status: COMPLETED | OUTPATIENT
Start: 2018-07-31 | End: 2018-07-31

## 2018-07-31 RX ORDER — ATORVASTATIN CALCIUM 80 MG/1
1 TABLET, FILM COATED ORAL
Qty: 0 | Refills: 0 | DISCHARGE
Start: 2018-07-31

## 2018-07-31 RX ADMIN — LOSARTAN POTASSIUM 25 MILLIGRAM(S): 100 TABLET, FILM COATED ORAL at 06:15

## 2018-07-31 RX ADMIN — Medication 650 MILLIGRAM(S): at 09:30

## 2018-07-31 RX ADMIN — REGADENOSON 0.4 MILLIGRAM(S): 0.08 INJECTION, SOLUTION INTRAVENOUS at 10:00

## 2018-07-31 RX ADMIN — Medication 100 MILLIGRAM(S): at 06:15

## 2018-07-31 RX ADMIN — PANTOPRAZOLE SODIUM 40 MILLIGRAM(S): 20 TABLET, DELAYED RELEASE ORAL at 06:15

## 2018-07-31 NOTE — DISCHARGE NOTE ADULT - CARE PLAN
Principal Discharge DX:	Intercostal pain  Goal:	chest pain free  Assessment and plan of treatment:	pain management with warm or cold compress   follow up with your primary care provider in 1-3 days  Secondary Diagnosis:	Type 2 diabetes mellitus without complication, unspecified whether long term insulin use  Goal:	good glycemic control  Assessment and plan of treatment:	Follow up with your diabetes doctor (endocrinologist) within 1 week   diabetic diet with all meals; keep in mind that some fruits are high in sugar  take your medications as prescribed

## 2018-07-31 NOTE — DISCHARGE NOTE ADULT - MEDICATION SUMMARY - MEDICATIONS TO TAKE
I will START or STAY ON the medications listed below when I get home from the hospital:    losartan 100 mg oral tablet  -- 1 tab(s) by mouth once a day  -- Indication: For Essential hypertension    Touilia SoloStar 300 units/mL subcutaneous solution  -- 20 unit(s) subcutaneous once a day (at bedtime)      ****Patient states between 20 and 25 units every evening***  -- Indication: For Diabetes    glipiZIDE 10 mg oral tablet  -- 1 tab(s) by mouth 2 times a day  -- Indication: For Diabetes    HumaLOG 100 units/mL injectable solution  -- 3 dose(s) injectable 3 times a day (before meals)      *****Sliding Scale*****  -- Indication: For Diabetes    pioglitazone 30 mg oral tablet  -- 1 tab(s) by mouth once a day  -- Indication: For Continue your home medications for diabetes    atorvastatin 10 mg oral tablet  -- 1 tab(s) by mouth once a day (at bedtime)  -- Indication: For Continue your home medicine for cholesterol    polyethylene glycol 3350 oral powder for reconstitution  -- 1 dose(s) by mouth once a day  -- Indication: For Constipation    omeprazole 40 mg oral delayed release capsule  -- 1 cap(s) by mouth once a day  -- Indication: For preventative

## 2018-07-31 NOTE — CONSULT NOTE ADULT - SUBJECTIVE AND OBJECTIVE BOX
CHIEF COMPLAINT: Chest discomfort x several days    HPI:  58 year old woman with a history of DM, HTN, and hyperlipidemia presented to the ED on  with complaints of chest discomfort and SOB x several days.  She describes a mild "tightness" across her chest that is exacerbated by deep inspiration and relieved by rest; symptoms started 2 days prior to presentation; no associated diaphoresis of nausea; occasional radiation towards L shoulder.  She has no known history of heart disease.    PAST MEDICAL & SURGICAL HISTORY:  HLD (hyperlipidemia)  Shoulder impingement, left  HTN (hypertension)  Cerebrovascular accident  Diabetes  Disorder of left rotator cuff  H/O: hysterectomy:   H/O:  section: 2x  History of cholecystectomy: 25 yrs ago    SOCIAL HISTORY:   Alcohol: Denied  Smoking: Nonsmoker    FAMILY HISTORY: FAMILY HISTORY:  Family history of heart disease (Father)  Family history of colon cancer (Mother)    MEDICATIONS  (STANDING):  atorvastatin 10 milliGRAM(s) Oral at bedtime  dextrose 5%. 1000 milliLiter(s) (50 mL/Hr) IV Continuous <Continuous>  docusate sodium 100 milliGRAM(s) Oral three times a day  enoxaparin Injectable 40 milliGRAM(s) SubCutaneous every 24 hours  insulin glargine Injectable (LANTUS) 40 Unit(s) SubCutaneous at bedtime  losartan 25 milliGRAM(s) Oral daily  pantoprazole    Tablet 40 milliGRAM(s) Oral before breakfast    Allergies: No Known Allergies    REVIEW OF SYSTEMS:  CONSTITUTIONAL: No weakness, fevers or chills  Eyes: No visual changes  NECK: No pain or stiffness  RESPIRATORY: No cough, wheezing, hemoptysis; No shortness of breath  CARDIOVASCULAR: + chest pain, no palpitations  GASTROINTESTINAL: No abdominal pain. No nausea, vomiting, or hematemesis; No diarrhea or constipation. No melena or hematochezia.  GENITOURINARY: No dysuria, frequency or hematuria  NEUROLOGICAL: No numbness.  SKIN: No itching or rash  All other review of systems is negative unless indicated above    VITAL SIGNS:   Vital Signs Last 24 Hrs  T(C): 36.2 (2018 04:50), Max: 37 (2018 11:17)  T(F): 97.2 (2018 04:50), Max: 98.6 (2018 11:17)  HR: 61 (2018 04:50) (61 - 80)  BP: 132/72 (2018 04:50) (123/80 - 142/76)  RR: 18 (2018 04:50) (14 - 18)  SpO2: 98% (2018 04:50) (96% - 99%)    PHYSICAL EXAM:  Constitutional: NAD, awake and alert, seated in bedside chair  HEENT:  Pupils round, No oral cyanosis.  Pulmonary: Non-labored, breath sounds are clear bilaterally, No wheezing, rales or rhonchi  Cardiovascular: S1 and S2, regular rate and rhythm  Gastrointestinal: Bowel Sounds present, soft, nontender.   Lymph: No peripheral edema. No cervical lymphadenopathy.  Neurological: Alert, no focal deficits  Skin: No rashes.  Psych:  Mood & affect appropriate    LABS:                      12.8   5.89  )-----------( 219      ( 2018 06:12 )             38.9                      144    |  110    |  17     ----------------------------<  115    4.3     |  28     |  0.71     Ca    9.1        2018 06:12    CARDIAC MARKERS ( 2018 06:12 ) x     / x     / 203 U/L / x     / x      CARDIAC MARKERS ( 2018 19:49 ) <0.015 ng/mL / x     / x     / x     / x      CARDIAC MARKERS ( 2018 15:55 ) <0.015 ng/mL / x     / x     / x     / x      CARDIAC MARKERS ( 2018 12:29 ) <0.015 ng/mL / x     / 298 U/L / x     / x        ECG (18): Normal sinus rhythm    Tele: Sinus rhythm    Xray Chest 1 View AP/PA. (18 @ 12:55):  Heart magnified by AP film shallow inspiration. Grossly clear lungs. No consolidation or effusion.

## 2018-07-31 NOTE — DISCHARGE NOTE ADULT - HOSPITAL COURSE
PATIENT SEEN, CHART REVIEWED, PT SEEN WITH Dr LOZADA    58 year old Woman with PMH HTN, hyperlipidemia, diabetes, left rotator cuff disorder presented for evaluation of chest pain, substernal and across the chest, constant, worse on movement and deep inspiration, radiating to LT shoulder, no SOB, no N/V, no diaphoresis. CE's ECG negative, MPI normal. Pt stable for discharge with outpt f/u with her primary PCP and cardiologist. Pt educated on the post discharge meds, follow up and procedure care. She is to make an alejandro't to f/u with PCP within 1 week. Pt verbalized an understanding of and agreed with discharge plans    VITALS  Vital Signs Last 24 Hrs  T(C): 36.2 (31 Jul 2018 04:50), Max: 36.9 (30 Jul 2018 15:30)  T(F): 97.2 (31 Jul 2018 04:50), Max: 98.4 (30 Jul 2018 15:30)  HR: 61 (31 Jul 2018 04:50) (61 - 80)  BP: 132/72 (31 Jul 2018 04:50) (123/80 - 137/68)  BP(mean): --  RR: 18 (31 Jul 2018 04:50) (14 - 18)  SpO2: 98% (31 Jul 2018 04:50) (96% - 98%)    PHYSICAL EXAM:  Constitutional: NAD, awake and alert, seated in bedside chair  HEENT:  Pupils round, No oral cyanosis.  Pulmonary: Non-labored, breath sounds are clear bilaterally, No wheezing, rales or rhonchi  Cardiovascular: S1 and S2, regular rate and rhythm, +reproducible pain to ACW more to Left side, no crepitus, no bruising noted  Gastrointestinal: Bowel Sounds present, soft, nontender.   Lymph: No peripheral edema. No cervical lymphadenopathy.  Neurological: Alert, no focal deficits  Skin: No rashes.  Psych:  Mood & affect appropriate  TELE: NSR 60's - 70's    A/P  Chest pain -Likely musculoskeletal  - she r/o ACS with negative cardiac biomarkers  - hx of rotator cuff to L shoulder for which she receives PT (?vigorous PT)  - MPI w/o fixed or reversible perfusion defect, no abnormal wall motion; normal LV size and function  - appreciate cardiology input  - con't  home dose statin at discharge  - not on asa d/t hx of hemorraghic CVA  - warm/cold compress for musculoskeletal pain    HTN/hyperlipidemia/Diabetes  Cont home meds  - to f/u with her PCP who manages her diabetes for A1c of 9.5% (prior results unk)    Dispo  - full code  - dc home PATIENT SEEN, CHART REVIEWED, PT SEEN WITH Dr LOZADA    58 year old Woman with PMH HTN, hyperlipidemia, diabetes, left rotator cuff disorder presented for evaluation of chest pain, substernal and across the chest, constant, worse on movement and deep inspiration, radiating to LT shoulder, no SOB, no N/V, no diaphoresis. CE's ECG negative, MPI normal. Pt stable for discharge with outpt f/u with her primary PCP and cardiologist. Pt educated on the post discharge meds, follow up and procedure care. She is to make an alejandro't to f/u with PCP within 1 week. Pt verbalized an understanding of and agreed with discharge plans.    HOSPITAL COURSE  7/31/18; Denies SOB, +ACW tenderness and with movement otherwise no chest pain; no palpitations/HA/dizziness/vision changes/abd pain/n/v/d/f/c; feels ok    VITALS  Vital Signs Last 24 Hrs  T(C): 36.2 (31 Jul 2018 04:50), Max: 36.9 (30 Jul 2018 15:30)  T(F): 97.2 (31 Jul 2018 04:50), Max: 98.4 (30 Jul 2018 15:30)  HR: 61 (31 Jul 2018 04:50) (61 - 80)  BP: 132/72 (31 Jul 2018 04:50) (123/80 - 137/68)  BP(mean): --  RR: 18 (31 Jul 2018 04:50) (14 - 18)  SpO2: 98% (31 Jul 2018 04:50) (96% - 98%)    PHYSICAL EXAM:  Constitutional: NAD, awake and alert, seated in bedside chair  HEENT:  Pupils round, No oral cyanosis.  Pulmonary: Non-labored, breath sounds are clear bilaterally, No wheezing, rales or rhonchi  Cardiovascular: S1 and S2, regular rate and rhythm, +reproducible pain to ACW more to Left side, no crepitus, no bruising noted  Gastrointestinal: Bowel Sounds present, soft, nontender.   Lymph: No peripheral edema. No cervical lymphadenopathy.  Neurological: Alert, no focal deficits  Skin: No rashes.  Psych:  Mood & affect appropriate  TELE: NSR 60's - 70's    A/P  Chest pain -Likely musculoskeletal  - she r/o ACS with negative cardiac biomarkers  - hx of rotator cuff to L shoulder for which she receives PT (?vigorous PT)  - MPI w/o fixed or reversible perfusion defect, no abnormal wall motion; normal LV size and function  - appreciate cardiology input  - con't  home dose statin at discharge  - not on asa d/t hx of hemorraghic CVA  - warm/cold compress for musculoskeletal pain    HTN/hyperlipidemia/Diabetes  Cont home meds  - to f/u with her PCP who manages her diabetes for A1c of 9.5% (prior results unk)    Dispo  - full code  - dc home PATIENT SEEN, CHART REVIEWED, PT SEEN WITH Dr LOZADA  CC: CP  58 year old Woman with PMH HTN, hyperlipidemia, diabetes, left rotator cuff disorder presented for evaluation of chest pain, substernal and across the chest, constant, worse on movement and deep inspiration, radiating to LT shoulder, no SOB, no N/V, no diaphoresis. CE's ECG negative, MPI normal. Pt stable for discharge with outpt f/u with her primary PCP and cardiologist. Pt educated on the post discharge meds, follow up and procedure care. She is to make an alejandro't to f/u with PCP within 1 week. Pt verbalized an understanding of and agreed with discharge plans.    HOSPITAL COURSE  7/31/18; Denies SOB, +ACW tenderness and with movement otherwise no chest pain; no palpitations/HA/dizziness/vision changes/abd pain/n/v/d/f/c; feels ok    REVIEW OF SYSTEMS: All other review of systems is negative unless indicated above.    VITALS  Vital Signs Last 24 Hrs  T(C): 36.2 (31 Jul 2018 04:50), Max: 36.9 (30 Jul 2018 15:30)  T(F): 97.2 (31 Jul 2018 04:50), Max: 98.4 (30 Jul 2018 15:30)  HR: 61 (31 Jul 2018 04:50) (61 - 80)  BP: 132/72 (31 Jul 2018 04:50) (123/80 - 137/68)  BP(mean): --  RR: 18 (31 Jul 2018 04:50) (14 - 18)  SpO2: 98% (31 Jul 2018 04:50) (96% - 98%)    PHYSICAL EXAM:  Constitutional: NAD, awake and alert, seated in bedside chair  HEENT:  Pupils round, No oral cyanosis.  Pulmonary: Non-labored, breath sounds are clear bilaterally, No wheezing, rales or rhonchi  Cardiovascular: S1 and S2, regular rate and rhythm, +reproducible pain to ACW more to Left side, no crepitus, no bruising noted  Gastrointestinal: Bowel Sounds present, soft, nontender.   Lymph: No peripheral edema. No cervical lymphadenopathy.  Neurological: Alert, no focal deficits  Skin: No rashes.  Psych:  Mood & affect appropriate  TELE: NSR 60's - 70's    A/P  Chest pain -Likely musculoskeletal  - she r/o ACS with negative cardiac biomarkers  - hx of rotator cuff to L shoulder for which she receives PT (?vigorous PT)  - MPI w/o fixed or reversible perfusion defect, no abnormal wall motion; normal LV size and function  - appreciate cardiology input  - con't  home dose statin at discharge  - not on asa d/t hx of hemorraghic CVA  - warm/cold compress for musculoskeletal pain    HTN/hyperlipidemia/Diabetes  Cont home meds  - to f/u with her PCP who manages her diabetes for A1c of 9.5% (prior results unk)    Attending Statement: 40 minutes spent on total encounter  Pt seen and examined with BOBBI Clarke. I personally had a face to face encounter with the patient, examined the patient myself and reviewed the plan of care with the patient and said BOBBI Clarke. I agree with the assessment and plan of BOBBI Clarke as stated and discussed.   Pt ruled out for ACS, trops negative, no acute EKG changes. Stress testing normal.  Etiology more likely musculoskeletal, possible costochondritis or referred pain from shoulder.  She can go home with supportive care/pain management and outpatient follow up.  Follow up with pcp regarding poorly controlled diabetes.

## 2018-07-31 NOTE — DISCHARGE NOTE ADULT - PATIENT PORTAL LINK FT
You can access the [facility] Patient Portal, offered by [facility], by registering with the following website: [email or web address]

## 2018-07-31 NOTE — DISCHARGE NOTE ADULT - PROVIDER TOKENS
TOKEN:'[id number]',FREE:[LAST:[julio],FIRST:[Barbara],PHONE:[(   )    -],FAX:[(   )    -],ADDRESS:[Follow up with your primary care provider]]

## 2018-07-31 NOTE — DISCHARGE NOTE ADULT - PLAN OF CARE
chest pain free pain management with warm or cold compress   follow up with your primary care provider in 1-3 days good glycemic control Follow up with your diabetes doctor (endocrinologist) within 1 week   diabetic diet with all meals; keep in mind that some fruits are high in sugar  take your medications as prescribed

## 2018-07-31 NOTE — DISCHARGE NOTE ADULT - CARE PROVIDER_API CALL
Carlos Fofana), Cardiovascular Disease; Internal Medicine; Interventional Cardiology  [address]  [place] [address]  Phone: [phone]  Fax: [phone]    Barbara julio  Follow up with your primary care provider  Phone: (   )    -  Fax: (   )    -

## 2018-07-31 NOTE — DISCHARGE NOTE ADULT - OTHER SIGNIFICANT FINDINGS
< from: NM Nuclear Stress Pharmacologic Multiple (07.31.18 @ 11:31) >  IMPRESSION: Normal SPECT Myocardial Perfusion Imaging.   No scan evidence of reversible or fixed perfusion defects.  Normal left ventricular contractility with an ejection fraction of 71%   (Normal: 50% or greater).  No regional wall motion abnormalities.    < end of copied text >        < from: TTE Echo Complete w/Doppler (04.24.15 @ 08:03) >   IMPRESSION:  Summary:   1. Normal global left ventricular systolic function.   2. Left ventricular ejection fraction, by visual estimation, is 65 to   70%.   3. Sclerotic aortic valve with normal opening.   4. There is no evidence of pericardial effusion.    < end of copied text >

## 2018-07-31 NOTE — CONSULT NOTE ADULT - PROBLEM SELECTOR RECOMMENDATION 9
Atypical angina with normal ECG and normal serial troponin assays despite protracted pain episode; however, multiple risk factors for heart disease (DM, HTN, HLD, father with MI); recommend nuclear stress test (ordered).

## 2018-08-15 DIAGNOSIS — Z79.4 LONG TERM (CURRENT) USE OF INSULIN: ICD-10-CM

## 2018-08-15 DIAGNOSIS — Z90.710 ACQUIRED ABSENCE OF BOTH CERVIX AND UTERUS: ICD-10-CM

## 2018-08-15 DIAGNOSIS — R07.9 CHEST PAIN, UNSPECIFIED: ICD-10-CM

## 2018-08-15 DIAGNOSIS — E78.5 HYPERLIPIDEMIA, UNSPECIFIED: ICD-10-CM

## 2018-08-15 DIAGNOSIS — R07.89 OTHER CHEST PAIN: ICD-10-CM

## 2018-08-15 DIAGNOSIS — E11.9 TYPE 2 DIABETES MELLITUS WITHOUT COMPLICATIONS: ICD-10-CM

## 2018-08-15 DIAGNOSIS — Z79.891 LONG TERM (CURRENT) USE OF OPIATE ANALGESIC: ICD-10-CM

## 2018-08-15 DIAGNOSIS — I10 ESSENTIAL (PRIMARY) HYPERTENSION: ICD-10-CM

## 2018-08-15 DIAGNOSIS — M94.0 CHONDROCOSTAL JUNCTION SYNDROME [TIETZE]: ICD-10-CM

## 2018-08-15 DIAGNOSIS — I20.9 ANGINA PECTORIS, UNSPECIFIED: ICD-10-CM

## 2018-08-15 DIAGNOSIS — Z90.49 ACQUIRED ABSENCE OF OTHER SPECIFIED PARTS OF DIGESTIVE TRACT: ICD-10-CM

## 2018-08-15 DIAGNOSIS — Z86.73 PERSONAL HISTORY OF TRANSIENT ISCHEMIC ATTACK (TIA), AND CEREBRAL INFARCTION WITHOUT RESIDUAL DEFICITS: ICD-10-CM

## 2018-08-15 DIAGNOSIS — M79.1 MYALGIA: ICD-10-CM

## 2019-02-12 PROBLEM — E78.5 HYPERLIPIDEMIA, UNSPECIFIED: Chronic | Status: ACTIVE | Noted: 2018-07-30

## 2019-02-19 NOTE — ASU DISCHARGE PLAN (ADULT/PEDIATRIC). - MEDICATION SUMMARY - MEDICATIONS TO CHANGE
Call from answering service  Lab from [facility]: c-diff toxin positive  I will SWITCH the dose or number of times a day I take the medications listed below when I get home from the hospital:  None

## 2019-02-25 ENCOUNTER — OUTPATIENT (OUTPATIENT)
Dept: OUTPATIENT SERVICES | Facility: HOSPITAL | Age: 59
LOS: 1 days | Discharge: HOME | End: 2019-02-25
Payer: MEDICAID

## 2019-02-25 VITALS
RESPIRATION RATE: 20 BRPM | SYSTOLIC BLOOD PRESSURE: 162 MMHG | TEMPERATURE: 98 F | HEIGHT: 60 IN | OXYGEN SATURATION: 100 % | WEIGHT: 162.04 LBS | DIASTOLIC BLOOD PRESSURE: 83 MMHG | HEART RATE: 73 BPM

## 2019-02-25 DIAGNOSIS — Z98.891 HISTORY OF UTERINE SCAR FROM PREVIOUS SURGERY: Chronic | ICD-10-CM

## 2019-02-25 DIAGNOSIS — Z98.89 OTHER SPECIFIED POSTPROCEDURAL STATES: Chronic | ICD-10-CM

## 2019-02-25 DIAGNOSIS — G56.01 CARPAL TUNNEL SYNDROME, RIGHT UPPER LIMB: ICD-10-CM

## 2019-02-25 DIAGNOSIS — M67.912 UNSPECIFIED DISORDER OF SYNOVIUM AND TENDON, LEFT SHOULDER: Chronic | ICD-10-CM

## 2019-02-25 DIAGNOSIS — Z90.710 ACQUIRED ABSENCE OF BOTH CERVIX AND UTERUS: Chronic | ICD-10-CM

## 2019-02-25 LAB
ANION GAP SERPL CALC-SCNC: 7 MMOL/L — SIGNIFICANT CHANGE UP (ref 5–17)
APTT BLD: 28.5 SEC — SIGNIFICANT CHANGE UP (ref 27.5–36.3)
BASOPHILS # BLD AUTO: 0.04 K/UL — SIGNIFICANT CHANGE UP (ref 0–0.2)
BASOPHILS NFR BLD AUTO: 0.6 % — SIGNIFICANT CHANGE UP (ref 0–2)
BUN SERPL-MCNC: 11 MG/DL — SIGNIFICANT CHANGE UP (ref 7–23)
CALCIUM SERPL-MCNC: 9 MG/DL — SIGNIFICANT CHANGE UP (ref 8.5–10.1)
CHLORIDE SERPL-SCNC: 109 MMOL/L — HIGH (ref 96–108)
CO2 SERPL-SCNC: 28 MMOL/L — SIGNIFICANT CHANGE UP (ref 22–31)
CREAT SERPL-MCNC: 0.6 MG/DL — SIGNIFICANT CHANGE UP (ref 0.5–1.3)
EOSINOPHIL # BLD AUTO: 0.3 K/UL — SIGNIFICANT CHANGE UP (ref 0–0.5)
EOSINOPHIL NFR BLD AUTO: 4.3 % — SIGNIFICANT CHANGE UP (ref 0–6)
GLUCOSE SERPL-MCNC: 74 MG/DL — SIGNIFICANT CHANGE UP (ref 70–99)
HBA1C BLD-MCNC: 7.9 % — HIGH (ref 4–5.6)
HCT VFR BLD CALC: 36.7 % — SIGNIFICANT CHANGE UP (ref 34.5–45)
HGB BLD-MCNC: 12.2 G/DL — SIGNIFICANT CHANGE UP (ref 11.5–15.5)
IMM GRANULOCYTES NFR BLD AUTO: 0.3 % — SIGNIFICANT CHANGE UP (ref 0–1.5)
INR BLD: 0.98 RATIO — SIGNIFICANT CHANGE UP (ref 0.88–1.16)
LYMPHOCYTES # BLD AUTO: 1.65 K/UL — SIGNIFICANT CHANGE UP (ref 1–3.3)
LYMPHOCYTES # BLD AUTO: 23.9 % — SIGNIFICANT CHANGE UP (ref 13–44)
MCHC RBC-ENTMCNC: 29 PG — SIGNIFICANT CHANGE UP (ref 27–34)
MCHC RBC-ENTMCNC: 33.2 GM/DL — SIGNIFICANT CHANGE UP (ref 32–36)
MCV RBC AUTO: 87.2 FL — SIGNIFICANT CHANGE UP (ref 80–100)
MONOCYTES # BLD AUTO: 0.36 K/UL — SIGNIFICANT CHANGE UP (ref 0–0.9)
MONOCYTES NFR BLD AUTO: 5.2 % — SIGNIFICANT CHANGE UP (ref 2–14)
NEUTROPHILS # BLD AUTO: 4.53 K/UL — SIGNIFICANT CHANGE UP (ref 1.8–7.4)
NEUTROPHILS NFR BLD AUTO: 65.7 % — SIGNIFICANT CHANGE UP (ref 43–77)
NRBC # BLD: 0 /100 WBCS — SIGNIFICANT CHANGE UP (ref 0–0)
PLATELET # BLD AUTO: 225 K/UL — SIGNIFICANT CHANGE UP (ref 150–400)
POTASSIUM SERPL-MCNC: 4.1 MMOL/L — SIGNIFICANT CHANGE UP (ref 3.5–5.3)
POTASSIUM SERPL-SCNC: 4.1 MMOL/L — SIGNIFICANT CHANGE UP (ref 3.5–5.3)
PROTHROM AB SERPL-ACNC: 10.9 SEC — SIGNIFICANT CHANGE UP (ref 10–12.9)
RBC # BLD: 4.21 M/UL — SIGNIFICANT CHANGE UP (ref 3.8–5.2)
RBC # FLD: 12.6 % — SIGNIFICANT CHANGE UP (ref 10.3–14.5)
SODIUM SERPL-SCNC: 144 MMOL/L — SIGNIFICANT CHANGE UP (ref 135–145)
WBC # BLD: 6.9 K/UL — SIGNIFICANT CHANGE UP (ref 3.8–10.5)
WBC # FLD AUTO: 6.9 K/UL — SIGNIFICANT CHANGE UP (ref 3.8–10.5)

## 2019-02-25 PROCEDURE — 93010 ELECTROCARDIOGRAM REPORT: CPT

## 2019-02-25 RX ORDER — INSULIN LISPRO 100/ML
3 VIAL (ML) SUBCUTANEOUS
Qty: 0 | Refills: 0 | COMMUNITY

## 2019-02-25 RX ORDER — LOSARTAN POTASSIUM 100 MG/1
1 TABLET, FILM COATED ORAL
Qty: 0 | Refills: 0 | COMMUNITY

## 2019-02-25 RX ORDER — PIOGLITAZONE HYDROCHLORIDE 15 MG/1
1 TABLET ORAL
Qty: 0 | Refills: 0 | COMMUNITY

## 2019-02-25 RX ORDER — INSULIN GLARGINE 100 [IU]/ML
20 INJECTION, SOLUTION SUBCUTANEOUS
Qty: 0 | Refills: 0 | COMMUNITY

## 2019-02-25 RX ORDER — POLYETHYLENE GLYCOL 3350 17 G/17G
1 POWDER, FOR SOLUTION ORAL
Qty: 0 | Refills: 0 | COMMUNITY

## 2019-02-25 RX ORDER — OMEPRAZOLE 10 MG/1
1 CAPSULE, DELAYED RELEASE ORAL
Qty: 0 | Refills: 0 | COMMUNITY

## 2019-02-25 NOTE — H&P PST ADULT - PMH
Carpal tunnel syndrome of right wrist    Cerebrovascular accident    Diabetes    HLD (hyperlipidemia)    HTN (hypertension)    OA (osteoarthritis) of finger, right    Shoulder impingement, left

## 2019-02-25 NOTE — H&P PST ADULT - ASSESSMENT
59 y/o female with right carpal tunnel syndrome. Scheduled for right carpal tunnel release.     Plan  1. Stop all NSAIDS, herbal supplements and vitamins for 7 days.  2. NPO at midnight.  3. Take the following medications ( losartan  ) with small sips of water on the morning of your procedure/surgery.  4. Use EZ sponges as directed  5. Labs, EKG as per surgeon  6. PMD visit for optimization prior to surgery as per surgeon

## 2019-02-25 NOTE — H&P PST ADULT - MUSCULOSKELETAL
detailed exam decreased ROM due to pain/right wrist/normal strength details… right wrist/decreased ROM due to pain

## 2019-02-25 NOTE — H&P PST ADULT - HISTORY OF PRESENT ILLNESS
59 y/o female with right carpal tunnel syndrome. Complain of right hand/wrist pain and numbness. Scheduled for right carpal tunnel release.

## 2019-03-04 ENCOUNTER — OUTPATIENT (OUTPATIENT)
Dept: OUTPATIENT SERVICES | Facility: HOSPITAL | Age: 59
LOS: 1 days | Discharge: HOME | End: 2019-03-04

## 2019-03-04 VITALS
SYSTOLIC BLOOD PRESSURE: 158 MMHG | HEART RATE: 67 BPM | DIASTOLIC BLOOD PRESSURE: 82 MMHG | RESPIRATION RATE: 14 BRPM | TEMPERATURE: 98 F | OXYGEN SATURATION: 99 %

## 2019-03-04 VITALS
OXYGEN SATURATION: 100 % | RESPIRATION RATE: 16 BRPM | DIASTOLIC BLOOD PRESSURE: 80 MMHG | HEIGHT: 60 IN | TEMPERATURE: 98 F | WEIGHT: 162.04 LBS | SYSTOLIC BLOOD PRESSURE: 152 MMHG | HEART RATE: 76 BPM

## 2019-03-04 DIAGNOSIS — Z98.89 OTHER SPECIFIED POSTPROCEDURAL STATES: Chronic | ICD-10-CM

## 2019-03-04 DIAGNOSIS — M67.912 UNSPECIFIED DISORDER OF SYNOVIUM AND TENDON, LEFT SHOULDER: Chronic | ICD-10-CM

## 2019-03-04 DIAGNOSIS — Z98.891 HISTORY OF UTERINE SCAR FROM PREVIOUS SURGERY: Chronic | ICD-10-CM

## 2019-03-04 DIAGNOSIS — Z90.710 ACQUIRED ABSENCE OF BOTH CERVIX AND UTERUS: Chronic | ICD-10-CM

## 2019-03-04 LAB
GLUCOSE BLDC GLUCOMTR-MCNC: 113 MG/DL — HIGH (ref 70–99)
GLUCOSE BLDC GLUCOMTR-MCNC: 97 MG/DL — SIGNIFICANT CHANGE UP (ref 70–99)

## 2019-03-04 RX ORDER — FENTANYL CITRATE 50 UG/ML
25 INJECTION INTRAVENOUS
Qty: 0 | Refills: 0 | Status: DISCONTINUED | OUTPATIENT
Start: 2019-03-04 | End: 2019-03-04

## 2019-03-04 RX ORDER — ACETAMINOPHEN 500 MG
1000 TABLET ORAL ONCE
Qty: 0 | Refills: 0 | Status: COMPLETED | OUTPATIENT
Start: 2019-03-04 | End: 2019-03-04

## 2019-03-04 RX ORDER — SODIUM CHLORIDE 9 MG/ML
1000 INJECTION, SOLUTION INTRAVENOUS
Qty: 0 | Refills: 0 | Status: DISCONTINUED | OUTPATIENT
Start: 2019-03-04 | End: 2019-03-04

## 2019-03-04 RX ORDER — ONDANSETRON 8 MG/1
4 TABLET, FILM COATED ORAL ONCE
Qty: 0 | Refills: 0 | Status: DISCONTINUED | OUTPATIENT
Start: 2019-03-04 | End: 2019-03-04

## 2019-03-04 RX ORDER — OXYCODONE HYDROCHLORIDE 5 MG/1
5 TABLET ORAL ONCE
Qty: 0 | Refills: 0 | Status: DISCONTINUED | OUTPATIENT
Start: 2019-03-04 | End: 2019-03-04

## 2019-03-04 RX ORDER — OXYCODONE HYDROCHLORIDE 5 MG/1
1 TABLET ORAL
Qty: 20 | Refills: 0
Start: 2019-03-04 | End: 2019-03-08

## 2019-03-04 RX ORDER — OXYCODONE HYDROCHLORIDE 5 MG/1
1 TABLET ORAL
Qty: 16 | Refills: 0 | OUTPATIENT
Start: 2019-03-04 | End: 2019-03-07

## 2019-03-04 RX ADMIN — SODIUM CHLORIDE 75 MILLILITER(S): 9 INJECTION, SOLUTION INTRAVENOUS at 10:47

## 2019-03-04 RX ADMIN — Medication 400 MILLIGRAM(S): at 10:44

## 2019-03-04 NOTE — ASU DISCHARGE PLAN (ADULT/PEDIATRIC). - ITEMS TO FOLLOWUP WITH YOUR PHYSICIAN'S
1.	Pain Control  2.	Weightbearing as tolerated right upper extremity  3.	DVT Prophylaxis - ambulate as tolerated  4.	PT as needed  5.	Follow up with Dr. Matta as Outpatient in 10-14 Days after Discharge from the Hospital. Call Office For Appointment.  6.	Remove sutures in office Post-Op Day 14 with Daily Dressing Changes as Need after 72 hours.   7.	Ice affected area as Needed  8.	Keep Dressing  Clean and dry.

## 2019-03-04 NOTE — BRIEF OPERATIVE NOTE - PROCEDURE
<<-----Click on this checkbox to enter Procedure Carpal tunnel release  03/04/2019    Active  AROSS7

## 2019-03-04 NOTE — ASU DISCHARGE PLAN (ADULT/PEDIATRIC). - MEDICATION SUMMARY - MEDICATIONS TO TAKE
I will START or STAY ON the medications listed below when I get home from the hospital:    losartan 100 mg oral tablet  -- 1 tab(s) by mouth once a day  -- Indication: For per pmd    Admelog 100 units/mL injectable solution  -- 30 unit(s) injectable 3 times a day before meals  -- Indication: For per pmd    Basaglar KwikPen 100 units/mL subcutaneous solution  -- 30 unit(s) subcutaneous 2 times a day  -- Indication: For per pmd    metFORMIN 500 mg oral tablet  -- 2 tab(s) by mouth 2 times a day  -- Indication: For per pmd    glipiZIDE 10 mg oral tablet  -- 1 tab(s) by mouth 2 times a day  -- Indication: For per mpd    atorvastatin 10 mg oral tablet  -- 1 tab(s) by mouth once a day (at bedtime)  -- Indication: For per pmd

## 2019-03-08 DIAGNOSIS — Z90.722 ACQUIRED ABSENCE OF OVARIES, BILATERAL: ICD-10-CM

## 2019-03-08 DIAGNOSIS — Z86.73 PERSONAL HISTORY OF TRANSIENT ISCHEMIC ATTACK (TIA), AND CEREBRAL INFARCTION WITHOUT RESIDUAL DEFICITS: ICD-10-CM

## 2019-03-08 DIAGNOSIS — I10 ESSENTIAL (PRIMARY) HYPERTENSION: ICD-10-CM

## 2019-03-08 DIAGNOSIS — E55.9 VITAMIN D DEFICIENCY, UNSPECIFIED: ICD-10-CM

## 2019-03-08 DIAGNOSIS — M85.80 OTHER SPECIFIED DISORDERS OF BONE DENSITY AND STRUCTURE, UNSPECIFIED SITE: ICD-10-CM

## 2019-03-08 DIAGNOSIS — E78.5 HYPERLIPIDEMIA, UNSPECIFIED: ICD-10-CM

## 2019-03-08 DIAGNOSIS — E11.49 TYPE 2 DIABETES MELLITUS WITH OTHER DIABETIC NEUROLOGICAL COMPLICATION: ICD-10-CM

## 2019-03-08 DIAGNOSIS — G56.01 CARPAL TUNNEL SYNDROME, RIGHT UPPER LIMB: ICD-10-CM

## 2019-03-08 DIAGNOSIS — Z79.4 LONG TERM (CURRENT) USE OF INSULIN: ICD-10-CM

## 2019-03-08 DIAGNOSIS — M19.90 UNSPECIFIED OSTEOARTHRITIS, UNSPECIFIED SITE: ICD-10-CM

## 2019-03-08 DIAGNOSIS — Z80.0 FAMILY HISTORY OF MALIGNANT NEOPLASM OF DIGESTIVE ORGANS: ICD-10-CM

## 2019-03-08 DIAGNOSIS — G89.29 OTHER CHRONIC PAIN: ICD-10-CM

## 2019-03-08 DIAGNOSIS — M16.12 UNILATERAL PRIMARY OSTEOARTHRITIS, LEFT HIP: ICD-10-CM

## 2019-03-08 DIAGNOSIS — E03.9 HYPOTHYROIDISM, UNSPECIFIED: ICD-10-CM

## 2019-03-08 DIAGNOSIS — Z90.49 ACQUIRED ABSENCE OF OTHER SPECIFIED PARTS OF DIGESTIVE TRACT: ICD-10-CM

## 2019-03-08 DIAGNOSIS — Z90.710 ACQUIRED ABSENCE OF BOTH CERVIX AND UTERUS: ICD-10-CM

## 2019-03-08 DIAGNOSIS — M75.42 IMPINGEMENT SYNDROME OF LEFT SHOULDER: ICD-10-CM

## 2019-06-08 ENCOUNTER — EMERGENCY (EMERGENCY)
Facility: HOSPITAL | Age: 59
LOS: 1 days | End: 2019-06-08
Attending: EMERGENCY MEDICINE
Payer: MEDICAID

## 2019-06-08 VITALS
HEART RATE: 85 BPM | TEMPERATURE: 98 F | WEIGHT: 160.06 LBS | RESPIRATION RATE: 16 BRPM | SYSTOLIC BLOOD PRESSURE: 147 MMHG | DIASTOLIC BLOOD PRESSURE: 86 MMHG | OXYGEN SATURATION: 97 % | HEIGHT: 60 IN

## 2019-06-08 VITALS
OXYGEN SATURATION: 98 % | DIASTOLIC BLOOD PRESSURE: 78 MMHG | SYSTOLIC BLOOD PRESSURE: 158 MMHG | RESPIRATION RATE: 18 BRPM | HEART RATE: 79 BPM

## 2019-06-08 DIAGNOSIS — Z90.710 ACQUIRED ABSENCE OF BOTH CERVIX AND UTERUS: Chronic | ICD-10-CM

## 2019-06-08 DIAGNOSIS — Z98.89 OTHER SPECIFIED POSTPROCEDURAL STATES: Chronic | ICD-10-CM

## 2019-06-08 DIAGNOSIS — Z98.891 HISTORY OF UTERINE SCAR FROM PREVIOUS SURGERY: Chronic | ICD-10-CM

## 2019-06-08 DIAGNOSIS — M67.912 UNSPECIFIED DISORDER OF SYNOVIUM AND TENDON, LEFT SHOULDER: Chronic | ICD-10-CM

## 2019-06-08 PROBLEM — M19.041 PRIMARY OSTEOARTHRITIS, RIGHT HAND: Chronic | Status: ACTIVE | Noted: 2019-02-25

## 2019-06-08 PROBLEM — G56.01 CARPAL TUNNEL SYNDROME, RIGHT UPPER LIMB: Chronic | Status: ACTIVE | Noted: 2019-02-25

## 2019-06-08 LAB
ALBUMIN SERPL ELPH-MCNC: 3.7 G/DL — SIGNIFICANT CHANGE UP (ref 3.3–5.2)
ALP SERPL-CCNC: 83 U/L — SIGNIFICANT CHANGE UP (ref 40–120)
ALT FLD-CCNC: 13 U/L — SIGNIFICANT CHANGE UP
ANION GAP SERPL CALC-SCNC: 11 MMOL/L — SIGNIFICANT CHANGE UP (ref 5–17)
APTT BLD: 26.7 SEC — LOW (ref 27.5–36.3)
AST SERPL-CCNC: 21 U/L — SIGNIFICANT CHANGE UP
BASOPHILS # BLD AUTO: 0 K/UL — SIGNIFICANT CHANGE UP (ref 0–0.2)
BASOPHILS NFR BLD AUTO: 0.2 % — SIGNIFICANT CHANGE UP (ref 0–2)
BILIRUB SERPL-MCNC: 0.9 MG/DL — SIGNIFICANT CHANGE UP (ref 0.4–2)
BUN SERPL-MCNC: 6 MG/DL — LOW (ref 8–20)
CALCIUM SERPL-MCNC: 9 MG/DL — SIGNIFICANT CHANGE UP (ref 8.6–10.2)
CHLORIDE SERPL-SCNC: 106 MMOL/L — SIGNIFICANT CHANGE UP (ref 98–107)
CK SERPL-CCNC: 109 U/L — SIGNIFICANT CHANGE UP (ref 25–170)
CO2 SERPL-SCNC: 22 MMOL/L — SIGNIFICANT CHANGE UP (ref 22–29)
CREAT SERPL-MCNC: 0.42 MG/DL — LOW (ref 0.5–1.3)
D DIMER BLD IA.RAPID-MCNC: 179 NG/ML DDU — SIGNIFICANT CHANGE UP
EOSINOPHIL # BLD AUTO: 0.3 K/UL — SIGNIFICANT CHANGE UP (ref 0–0.5)
EOSINOPHIL NFR BLD AUTO: 5 % — SIGNIFICANT CHANGE UP (ref 0–6)
GLUCOSE SERPL-MCNC: 153 MG/DL — HIGH (ref 70–115)
HCT VFR BLD CALC: 36.4 % — LOW (ref 37–47)
HGB BLD-MCNC: 12 G/DL — SIGNIFICANT CHANGE UP (ref 12–16)
INR BLD: 0.94 RATIO — SIGNIFICANT CHANGE UP (ref 0.88–1.16)
LYMPHOCYTES # BLD AUTO: 1.2 K/UL — SIGNIFICANT CHANGE UP (ref 1–4.8)
LYMPHOCYTES # BLD AUTO: 18.8 % — LOW (ref 20–55)
MCHC RBC-ENTMCNC: 28.5 PG — SIGNIFICANT CHANGE UP (ref 27–31)
MCHC RBC-ENTMCNC: 33 G/DL — SIGNIFICANT CHANGE UP (ref 32–36)
MCV RBC AUTO: 86.5 FL — SIGNIFICANT CHANGE UP (ref 81–99)
MONOCYTES # BLD AUTO: 0.3 K/UL — SIGNIFICANT CHANGE UP (ref 0–0.8)
MONOCYTES NFR BLD AUTO: 4.5 % — SIGNIFICANT CHANGE UP (ref 3–10)
NEUTROPHILS # BLD AUTO: 4.4 K/UL — SIGNIFICANT CHANGE UP (ref 1.8–8)
NEUTROPHILS NFR BLD AUTO: 71.3 % — SIGNIFICANT CHANGE UP (ref 37–73)
PLATELET # BLD AUTO: 205 K/UL — SIGNIFICANT CHANGE UP (ref 150–400)
POTASSIUM SERPL-MCNC: 4.4 MMOL/L — SIGNIFICANT CHANGE UP (ref 3.5–5.3)
POTASSIUM SERPL-SCNC: 4.4 MMOL/L — SIGNIFICANT CHANGE UP (ref 3.5–5.3)
PROT SERPL-MCNC: 6.8 G/DL — SIGNIFICANT CHANGE UP (ref 6.6–8.7)
PROTHROM AB SERPL-ACNC: 10.8 SEC — SIGNIFICANT CHANGE UP (ref 10–12.9)
RBC # BLD: 4.21 M/UL — LOW (ref 4.4–5.2)
RBC # FLD: 14 % — SIGNIFICANT CHANGE UP (ref 11–15.6)
SODIUM SERPL-SCNC: 139 MMOL/L — SIGNIFICANT CHANGE UP (ref 135–145)
TROPONIN T SERPL-MCNC: <0.01 NG/ML — SIGNIFICANT CHANGE UP (ref 0–0.06)
TROPONIN T SERPL-MCNC: <0.01 NG/ML — SIGNIFICANT CHANGE UP (ref 0–0.06)
WBC # BLD: 6.2 K/UL — SIGNIFICANT CHANGE UP (ref 4.8–10.8)
WBC # FLD AUTO: 6.2 K/UL — SIGNIFICANT CHANGE UP (ref 4.8–10.8)

## 2019-06-08 PROCEDURE — 85730 THROMBOPLASTIN TIME PARTIAL: CPT

## 2019-06-08 PROCEDURE — 93010 ELECTROCARDIOGRAM REPORT: CPT

## 2019-06-08 PROCEDURE — 96374 THER/PROPH/DIAG INJ IV PUSH: CPT

## 2019-06-08 PROCEDURE — 71045 X-RAY EXAM CHEST 1 VIEW: CPT

## 2019-06-08 PROCEDURE — 85610 PROTHROMBIN TIME: CPT

## 2019-06-08 PROCEDURE — 80053 COMPREHEN METABOLIC PANEL: CPT

## 2019-06-08 PROCEDURE — 99285 EMERGENCY DEPT VISIT HI MDM: CPT

## 2019-06-08 PROCEDURE — 36415 COLL VENOUS BLD VENIPUNCTURE: CPT

## 2019-06-08 PROCEDURE — 93005 ELECTROCARDIOGRAM TRACING: CPT

## 2019-06-08 PROCEDURE — 85027 COMPLETE CBC AUTOMATED: CPT

## 2019-06-08 PROCEDURE — 85379 FIBRIN DEGRADATION QUANT: CPT

## 2019-06-08 PROCEDURE — 71045 X-RAY EXAM CHEST 1 VIEW: CPT | Mod: 26

## 2019-06-08 PROCEDURE — 84484 ASSAY OF TROPONIN QUANT: CPT

## 2019-06-08 PROCEDURE — 82550 ASSAY OF CK (CPK): CPT

## 2019-06-08 PROCEDURE — 99284 EMERGENCY DEPT VISIT MOD MDM: CPT | Mod: 25

## 2019-06-08 RX ORDER — CYCLOBENZAPRINE HYDROCHLORIDE 10 MG/1
5 TABLET, FILM COATED ORAL ONCE
Refills: 0 | Status: COMPLETED | OUTPATIENT
Start: 2019-06-08 | End: 2019-06-08

## 2019-06-08 RX ORDER — ASPIRIN/CALCIUM CARB/MAGNESIUM 324 MG
325 TABLET ORAL ONCE
Refills: 0 | Status: COMPLETED | OUTPATIENT
Start: 2019-06-08 | End: 2019-06-08

## 2019-06-08 RX ORDER — IBUPROFEN 200 MG
1 TABLET ORAL
Qty: 20 | Refills: 0
Start: 2019-06-08 | End: 2019-06-12

## 2019-06-08 RX ORDER — KETOROLAC TROMETHAMINE 30 MG/ML
30 SYRINGE (ML) INJECTION ONCE
Refills: 0 | Status: DISCONTINUED | OUTPATIENT
Start: 2019-06-08 | End: 2019-06-08

## 2019-06-08 RX ADMIN — Medication 30 MILLIGRAM(S): at 14:59

## 2019-06-08 RX ADMIN — CYCLOBENZAPRINE HYDROCHLORIDE 5 MILLIGRAM(S): 10 TABLET, FILM COATED ORAL at 17:08

## 2019-06-08 RX ADMIN — Medication 325 MILLIGRAM(S): at 17:08

## 2019-06-08 RX ADMIN — Medication 30 MILLIGRAM(S): at 17:30

## 2019-06-08 NOTE — ED ADULT NURSE NOTE - OBJECTIVE STATEMENT
mid sternal cp radiating to the back. worse with inspiration and movement , pain started on Thursday , radities to the back.  breathing even and unlabored.

## 2019-06-08 NOTE — ED PROVIDER NOTE - CLINICAL SUMMARY MEDICAL DECISION MAKING FREE TEXT BOX
Pt with low risk chest pain and trop neg x 2. pain is clearly reproducible on nature, very tender at R sternal border and is clinically consistent with costochondritis. Will dc with ibuprofen and advised cardio f/u

## 2019-06-08 NOTE — ED PROVIDER NOTE - RESPIRATORY CHEST TENDERNESS
reproducible chest wall tenderness to b/l sternal border reproducible chest wall tenderness to b/l R>L sternal border

## 2019-06-08 NOTE — ED ADULT TRIAGE NOTE - CHIEF COMPLAINT QUOTE
mid sternal cp radiating to the back. worse with inspiration. a and o x3. breathing even and unlabored.

## 2019-06-08 NOTE — ED PROVIDER NOTE - OBJECTIVE STATEMENT
58 year old female with PMH DM, HTN, HLD presents with chest pain. Pt states that the pain started 2 days ago as a constant central pressure pain that radiates to her back and is worse with deep inspiration, changign positions, and movement. it is not worse with laying nor better with sitting up. She denies SOB, cough, LE edema, hemoptysis, abd pain, fever, chills.

## 2019-07-22 NOTE — ASU PATIENT PROFILE, ADULT - BRADEN SCORE (IF 18 OR LESS ACTIVATE SKIN INJURY RISK INCREASED GUIDELINE), MLM
23
Otc Regimen: Moisturize AM and PM, gentle cleansers
Initiate Treatment: Clindamycin lotion qAM\\nEpiduo Forte qPM\\nSeysara once a day
Detail Level: Simple

## 2019-08-03 ENCOUNTER — EMERGENCY (EMERGENCY)
Facility: HOSPITAL | Age: 59
LOS: 1 days | Discharge: HOME | End: 2019-08-03
Attending: EMERGENCY MEDICINE
Payer: MEDICAID

## 2019-08-03 VITALS
RESPIRATION RATE: 18 BRPM | HEIGHT: 60 IN | HEART RATE: 100 BPM | DIASTOLIC BLOOD PRESSURE: 90 MMHG | TEMPERATURE: 101 F | WEIGHT: 154.98 LBS | SYSTOLIC BLOOD PRESSURE: 155 MMHG | OXYGEN SATURATION: 98 %

## 2019-08-03 DIAGNOSIS — Z98.891 HISTORY OF UTERINE SCAR FROM PREVIOUS SURGERY: Chronic | ICD-10-CM

## 2019-08-03 DIAGNOSIS — M67.912 UNSPECIFIED DISORDER OF SYNOVIUM AND TENDON, LEFT SHOULDER: Chronic | ICD-10-CM

## 2019-08-03 DIAGNOSIS — Z98.89 OTHER SPECIFIED POSTPROCEDURAL STATES: Chronic | ICD-10-CM

## 2019-08-03 DIAGNOSIS — Z90.710 ACQUIRED ABSENCE OF BOTH CERVIX AND UTERUS: Chronic | ICD-10-CM

## 2019-08-03 LAB
ALBUMIN SERPL ELPH-MCNC: 4.7 G/DL — SIGNIFICANT CHANGE UP (ref 3.3–5.2)
ALP SERPL-CCNC: 92 U/L — SIGNIFICANT CHANGE UP (ref 40–120)
ALT FLD-CCNC: 18 U/L — SIGNIFICANT CHANGE UP
ANION GAP SERPL CALC-SCNC: 12 MMOL/L — SIGNIFICANT CHANGE UP (ref 5–17)
AST SERPL-CCNC: 28 U/L — SIGNIFICANT CHANGE UP
BASOPHILS # BLD AUTO: 0.03 K/UL — SIGNIFICANT CHANGE UP (ref 0–0.2)
BASOPHILS NFR BLD AUTO: 0.3 % — SIGNIFICANT CHANGE UP (ref 0–2)
BILIRUB SERPL-MCNC: 1 MG/DL — SIGNIFICANT CHANGE UP (ref 0.4–2)
BUN SERPL-MCNC: 8 MG/DL — SIGNIFICANT CHANGE UP (ref 8–20)
CALCIUM SERPL-MCNC: 9.7 MG/DL — SIGNIFICANT CHANGE UP (ref 8.6–10.2)
CHLORIDE SERPL-SCNC: 103 MMOL/L — SIGNIFICANT CHANGE UP (ref 98–107)
CO2 SERPL-SCNC: 24 MMOL/L — SIGNIFICANT CHANGE UP (ref 22–29)
CREAT SERPL-MCNC: 0.54 MG/DL — SIGNIFICANT CHANGE UP (ref 0.5–1.3)
EOSINOPHIL # BLD AUTO: 0.27 K/UL — SIGNIFICANT CHANGE UP (ref 0–0.5)
EOSINOPHIL NFR BLD AUTO: 3 % — SIGNIFICANT CHANGE UP (ref 0–6)
GLUCOSE SERPL-MCNC: 132 MG/DL — HIGH (ref 70–115)
HCT VFR BLD CALC: 40.8 % — SIGNIFICANT CHANGE UP (ref 34.5–45)
HGB BLD-MCNC: 13.4 G/DL — SIGNIFICANT CHANGE UP (ref 11.5–15.5)
IMM GRANULOCYTES NFR BLD AUTO: 0.2 % — SIGNIFICANT CHANGE UP (ref 0–1.5)
LACTATE BLDV-MCNC: 0.9 MMOL/L — SIGNIFICANT CHANGE UP (ref 0.5–2)
LYMPHOCYTES # BLD AUTO: 1.31 K/UL — SIGNIFICANT CHANGE UP (ref 1–3.3)
LYMPHOCYTES # BLD AUTO: 14.6 % — SIGNIFICANT CHANGE UP (ref 13–44)
MCHC RBC-ENTMCNC: 28.8 PG — SIGNIFICANT CHANGE UP (ref 27–34)
MCHC RBC-ENTMCNC: 32.8 GM/DL — SIGNIFICANT CHANGE UP (ref 32–36)
MCV RBC AUTO: 87.6 FL — SIGNIFICANT CHANGE UP (ref 80–100)
MONOCYTES # BLD AUTO: 0.4 K/UL — SIGNIFICANT CHANGE UP (ref 0–0.9)
MONOCYTES NFR BLD AUTO: 4.5 % — SIGNIFICANT CHANGE UP (ref 2–14)
NEUTROPHILS # BLD AUTO: 6.92 K/UL — SIGNIFICANT CHANGE UP (ref 1.8–7.4)
NEUTROPHILS NFR BLD AUTO: 77.4 % — HIGH (ref 43–77)
PLATELET # BLD AUTO: 271 K/UL — SIGNIFICANT CHANGE UP (ref 150–400)
POTASSIUM SERPL-MCNC: 4.9 MMOL/L — SIGNIFICANT CHANGE UP (ref 3.5–5.3)
POTASSIUM SERPL-SCNC: 4.9 MMOL/L — SIGNIFICANT CHANGE UP (ref 3.5–5.3)
PROT SERPL-MCNC: 8.2 G/DL — SIGNIFICANT CHANGE UP (ref 6.6–8.7)
RBC # BLD: 4.66 M/UL — SIGNIFICANT CHANGE UP (ref 3.8–5.2)
RBC # FLD: 12.6 % — SIGNIFICANT CHANGE UP (ref 10.3–14.5)
SODIUM SERPL-SCNC: 139 MMOL/L — SIGNIFICANT CHANGE UP (ref 135–145)
WBC # BLD: 8.95 K/UL — SIGNIFICANT CHANGE UP (ref 3.8–10.5)
WBC # FLD AUTO: 8.95 K/UL — SIGNIFICANT CHANGE UP (ref 3.8–10.5)

## 2019-08-03 PROCEDURE — 96374 THER/PROPH/DIAG INJ IV PUSH: CPT | Mod: XU

## 2019-08-03 PROCEDURE — 99284 EMERGENCY DEPT VISIT MOD MDM: CPT | Mod: 25

## 2019-08-03 PROCEDURE — 73140 X-RAY EXAM OF FINGER(S): CPT | Mod: 26,LT

## 2019-08-03 PROCEDURE — 36415 COLL VENOUS BLD VENIPUNCTURE: CPT

## 2019-08-03 PROCEDURE — 83605 ASSAY OF LACTIC ACID: CPT

## 2019-08-03 PROCEDURE — 73140 X-RAY EXAM OF FINGER(S): CPT

## 2019-08-03 PROCEDURE — 87040 BLOOD CULTURE FOR BACTERIA: CPT

## 2019-08-03 PROCEDURE — 80053 COMPREHEN METABOLIC PANEL: CPT

## 2019-08-03 PROCEDURE — 85027 COMPLETE CBC AUTOMATED: CPT

## 2019-08-03 PROCEDURE — 70491 CT SOFT TISSUE NECK W/DYE: CPT | Mod: 26

## 2019-08-03 PROCEDURE — T1013: CPT

## 2019-08-03 PROCEDURE — 99284 EMERGENCY DEPT VISIT MOD MDM: CPT

## 2019-08-03 PROCEDURE — 96375 TX/PRO/DX INJ NEW DRUG ADDON: CPT | Mod: XU

## 2019-08-03 PROCEDURE — 70491 CT SOFT TISSUE NECK W/DYE: CPT

## 2019-08-03 RX ORDER — KETOROLAC TROMETHAMINE 30 MG/ML
30 SYRINGE (ML) INJECTION ONCE
Refills: 0 | Status: DISCONTINUED | OUTPATIENT
Start: 2019-08-03 | End: 2019-08-03

## 2019-08-03 RX ORDER — SODIUM CHLORIDE 9 MG/ML
1000 INJECTION INTRAMUSCULAR; INTRAVENOUS; SUBCUTANEOUS ONCE
Refills: 0 | Status: COMPLETED | OUTPATIENT
Start: 2019-08-03 | End: 2019-08-03

## 2019-08-03 RX ORDER — SODIUM CHLORIDE 9 MG/ML
1500 INJECTION INTRAMUSCULAR; INTRAVENOUS; SUBCUTANEOUS ONCE
Refills: 0 | Status: COMPLETED | OUTPATIENT
Start: 2019-08-03 | End: 2019-08-03

## 2019-08-03 RX ADMIN — SODIUM CHLORIDE 1500 MILLILITER(S): 9 INJECTION INTRAMUSCULAR; INTRAVENOUS; SUBCUTANEOUS at 21:35

## 2019-08-03 RX ADMIN — Medication 30 MILLIGRAM(S): at 21:34

## 2019-08-03 RX ADMIN — SODIUM CHLORIDE 1000 MILLILITER(S): 9 INJECTION INTRAMUSCULAR; INTRAVENOUS; SUBCUTANEOUS at 18:51

## 2019-08-03 RX ADMIN — Medication 100 MILLIGRAM(S): at 18:50

## 2019-08-03 NOTE — ED STATDOCS - CLINICAL SUMMARY MEDICAL DECISION MAKING FREE TEXT BOX
Pt with R neck swelling, fever, no concern for Altaf's angina. airway intact,, no code sepsis at this time. temp at 100.9, Clinda, fluids, CAT scan, will x ray digit, no concern for septic joint. Has Hx of arthritis and will reassess.

## 2019-08-03 NOTE — ED STATDOCS - OBJECTIVE STATEMENT
60 y/o F pt with significant PMHx of CVA, OA, IDDM, HLD, and HTN presents to the ED with son c/o neck pain and neck swelling and 2nd digit of L hand swelling, onset 1 week ago. Associated sx of fever. She reports that she went to their PCP for similar symptoms recently, and was given anti inflammatory medication. Her PCP told the patient to come straight to the ED if symptoms worsened. NKDA. No further acute complaints at this time.

## 2019-08-03 NOTE — ED STATDOCS - PHYSICAL EXAMINATION
Gen: NAD, AOx3  Head: NCAT  HEENT: PERRL, EOMI, oral mucosa moist, normal conjunctiva, neck supple FROM Neck, oropharynx clear, speech intact. No elevation of base of tongue.  Lung: CTAB, no respiratory distress  CV: rrr, no murmur, Normal perfusion  Abd: soft, NTND, no CVA tenderness  MSK: Swelling and tenderness insertion of SAEID. positive swelling of L second digit PIP, no erythema or warmth.   Neuro: No focal neurologic deficits  Skin: No rash   Psych: normal affect

## 2019-08-03 NOTE — ED ADULT NURSE NOTE - OBJECTIVE STATEMENT
States neck pain and neck swelling and 2nd digit of L hand swelling, for past week. States that she went to their PCP for similar symptoms recently, and was given anti inflammatory medication. Her PCP told the patient to come straight to the ED if symptoms worsened.

## 2019-08-03 NOTE — ED ADULT TRIAGE NOTE - CHIEF COMPLAINT QUOTE
pt with pain and swelling to right anterior neck/upper chest for 1 week. pain worsening and radiating up right side of neck.

## 2019-08-03 NOTE — ED STATDOCS - PROGRESS NOTE DETAILS
60 y/o F pt with significant PMHx of CVA, OA, IDDM, HLD, and HTN presents to the ED with son c/o neck pain and neck swelling. pt reports she has had episodes of this intermittent neck swelling for 6 months and has never had any imaging done. + fever. HPI/ ROS/ PEx as stated above. Fluids, labs, xray finger and CT neck ordered. Will reassess. PA NOTE: No evidence of mass noted on ct, pt has thickened esophagus, advised of results, f/u with gastro for endoscopy, will d/c home on clinda, advised to f/u with hematologist and pcp

## 2019-08-03 NOTE — ED STATDOCS - ATTENDING CONTRIBUTION TO CARE
I, Gayathri Casanova, performed the initial face to face bedside interview with this patient regarding history of present illness, review of symptoms and relevant past medical, social and family history.  I completed an independent physical examination.   The medical decision making and follow-up on ordered tests (ie labs, radiologic studies) and re-evaluation of the patient's status has been communicated to the ACP.  Disposition of the patient will be based on test outcome and response to ED interventions.  The history, relevant review of systems, past medical and surgical history, medical decision making, and physical examination was documented by the scribe in my presence and I attest to the accuracy of the documentation.

## 2019-08-03 NOTE — ED STATDOCS - NS ED ROS FT
ROS: no CP/SOB. no cough. (+) fever. no n/v/d/c. no abd pain. no rash. no bleeding. no urinary complaints. no weakness. no vision changes. no HA. no neck/back pain. (+) Neck swelling, (+) 2nd digit of L hand swelling. deformity. No change in mental status.

## 2019-08-08 LAB
CULTURE RESULTS: SIGNIFICANT CHANGE UP
CULTURE RESULTS: SIGNIFICANT CHANGE UP
SPECIMEN SOURCE: SIGNIFICANT CHANGE UP
SPECIMEN SOURCE: SIGNIFICANT CHANGE UP

## 2019-08-26 ENCOUNTER — APPOINTMENT (OUTPATIENT)
Dept: RHEUMATOLOGY | Facility: CLINIC | Age: 59
End: 2019-08-26
Payer: MEDICAID

## 2019-08-26 VITALS
SYSTOLIC BLOOD PRESSURE: 142 MMHG | WEIGHT: 150 LBS | HEIGHT: 60 IN | OXYGEN SATURATION: 98 % | BODY MASS INDEX: 29.45 KG/M2 | DIASTOLIC BLOOD PRESSURE: 80 MMHG | HEART RATE: 70 BPM

## 2019-08-26 DIAGNOSIS — M11.269 OTHER CHONDROCALCINOSIS, UNSPECIFIED KNEE: ICD-10-CM

## 2019-08-26 DIAGNOSIS — M65.20 CALCIFIC TENDINITIS, UNSPECIFIED SITE: ICD-10-CM

## 2019-08-26 PROCEDURE — 99205 OFFICE O/P NEW HI 60 MIN: CPT

## 2019-08-26 RX ORDER — INSULIN GLARGINE 100 [IU]/ML
100 INJECTION, SOLUTION SUBCUTANEOUS
Refills: 0 | Status: ACTIVE | COMMUNITY

## 2019-08-26 RX ORDER — CHROMIUM 200 MCG
TABLET ORAL
Refills: 0 | Status: ACTIVE | COMMUNITY

## 2019-08-26 RX ORDER — DULAGLUTIDE 1.5 MG/.5ML
1.5 INJECTION, SOLUTION SUBCUTANEOUS
Refills: 0 | Status: ACTIVE | COMMUNITY

## 2019-08-26 RX ORDER — INSULIN LISPRO 100 U/ML
100 INJECTION, SOLUTION INTRAVENOUS; SUBCUTANEOUS
Refills: 0 | Status: ACTIVE | COMMUNITY

## 2019-08-26 RX ORDER — OMEPRAZOLE 40 MG/1
40 CAPSULE, DELAYED RELEASE ORAL
Refills: 0 | Status: ACTIVE | COMMUNITY

## 2019-08-26 RX ORDER — METHOCARBAMOL 500 MG/1
500 TABLET, FILM COATED ORAL
Refills: 0 | Status: ACTIVE | COMMUNITY

## 2019-08-26 RX ORDER — MELOXICAM 15 MG/1
15 TABLET ORAL
Refills: 0 | Status: ACTIVE | COMMUNITY

## 2019-08-28 RX ORDER — DICLOFENAC SODIUM 10 MG/G
1 GEL TOPICAL
Qty: 1 | Refills: 3 | Status: ACTIVE | COMMUNITY
Start: 2019-08-26

## 2019-09-03 NOTE — HISTORY OF PRESENT ILLNESS
[FreeTextEntry1] : RAPHAEL FLYNN is a 59 year old woman who presents for evaluation of diffuse complaints. + pain in L index finger recently, not improved with medrol dose pack, + duputrens in a few digits b/l and + R CTS but no bracing QHS. Also with pain/fullness over R collar bone over last few months, + pain which resolved s/p medrol but remains with protuberant bump. Not mentioned in recent imaging that patient has had. No associated synovitis, AM stiffness in peripheral joints. Also reports intentional 16lb weight loss in last 4 months, denies F/C, night sweats. Hx of OA in b/l knees, L 1st MTP, and b/l shoulder calcific tendinosis all with chronic pain, but not worsening. Also reports recent dx with esophagitis. \par \par Labs: ESR 55, Mild TSH elevation, SLE negative. RF 14 but negative by Igs\par XR hands, knees, feet -- OA, chondrocalcinosis -- no inflammatory arthritis evident \par \par Saw rheumatology last year, Dr Carney who felt symptoms more consistent with OA, CTS, dupytrens. SLE, RA, Lyme negative at that time, ESR/CRP negative.

## 2019-09-03 NOTE — ASSESSMENT
[FreeTextEntry1] : RAPHAEL FLYNN is a 59 year old woman who presents for evaluation of diffuse joint pains --suspect 2/2 OA and flexor tendon contractures given history, exam and review of imaging by prior rheumatologist. No evidence of inflammatory arthritis. ?R proximal clavicle fullness, resolution of pain 2/2 steroids, this may be inflammatory vs infectious vs malignant. Prior imaging without mention/ imaging affected area. Suspected CTS in R hand. Recent esophagitis precluding use of NSAIDs. \par \par - OT and PT referrals to improve joint ROM in shoulders and hands most importantly\par - diclofenac gel prn OA pain as would like to avoid PO NSAIDs given esophagitis\par - XR sternoclavicular joints to eval abnormal area, if inconclusive will review prior CT and/or obtain new one\par - check RF given mild high titer, ESR/CRP \par - RTC in 4-6 weeks

## 2019-09-03 NOTE — PHYSICAL EXAM
[General Appearance - In No Acute Distress] : in no acute distress [General Appearance - Alert] : alert [Sclera] : the sclera and conjunctiva were normal [General Appearance - Well Nourished] : well nourished [Outer Ear] : the ears and nose were normal in appearance [Extraocular Movements] : extraocular movements were intact [PERRL With Normal Accommodation] : pupils were equal in size, round, and reactive to light [Oropharynx] : the oropharynx was normal [Neck Appearance] : the appearance of the neck was normal [Neck Cervical Mass (___cm)] : no neck mass was observed [Thyroid Diffuse Enlargement] : the thyroid was not enlarged [Heart Rate And Rhythm] : heart rate was normal and rhythm regular [Auscultation Breath Sounds / Voice Sounds] : lungs were clear to auscultation bilaterally [Heart Sounds Gallop] : no gallops [Heart Sounds] : normal S1 and S2 [Heart Sounds Pericardial Friction Rub] : no pericardial rub [Murmurs] : no murmurs [Full Pulse] : the pedal pulses are present [Bowel Sounds] : normal bowel sounds [Edema] : there was no peripheral edema [Abdomen Soft] : soft [Abdomen Tenderness] : non-tender [Abdomen Mass (___ Cm)] : no abdominal mass palpated [Cervical Lymph Nodes Enlarged Posterior Bilaterally] : posterior cervical [Cervical Lymph Nodes Enlarged Anterior Bilaterally] : anterior cervical [Supraclavicular Lymph Nodes Enlarged Bilaterally] : supraclavicular [No CVA Tenderness] : no ~M costovertebral angle tenderness [No Spinal Tenderness] : no spinal tenderness [Nail Clubbing] : no clubbing  or cyanosis of the fingernails [Musculoskeletal - Swelling] : no joint swelling seen [Abnormal Walk] : normal gait [Motor Tone] : muscle strength and tone were normal [Skin Color & Pigmentation] : normal skin color and pigmentation [Skin Turgor] : normal skin turgor [Motor Exam] : the motor exam was normal [] : no rash [Oriented To Time, Place, And Person] : oriented to person, place, and time [No Focal Deficits] : no focal deficits [Impaired Insight] : insight and judgment were intact [Affect] : the affect was normal [FreeTextEntry1] : + heberdens and brouchards nodes diffusely, L index finger DIP node mildly tender, + dupytrens contractures in b/l hands, R proximal clavicle with fullness and protrusion but no TTP or erythema. No synovitis, b/l knees with crepitus. Limited active and passive ROM in shoulders

## 2019-09-30 ENCOUNTER — APPOINTMENT (OUTPATIENT)
Dept: RHEUMATOLOGY | Facility: CLINIC | Age: 59
End: 2019-09-30

## 2019-11-04 ENCOUNTER — APPOINTMENT (OUTPATIENT)
Dept: RHEUMATOLOGY | Facility: CLINIC | Age: 59
End: 2019-11-04
Payer: MEDICAID

## 2019-11-04 VITALS
DIASTOLIC BLOOD PRESSURE: 72 MMHG | OXYGEN SATURATION: 98 % | BODY MASS INDEX: 29.45 KG/M2 | HEIGHT: 60 IN | WEIGHT: 150 LBS | HEART RATE: 77 BPM | SYSTOLIC BLOOD PRESSURE: 122 MMHG

## 2019-11-04 PROCEDURE — 36415 COLL VENOUS BLD VENIPUNCTURE: CPT

## 2019-11-04 PROCEDURE — 20610 DRAIN/INJ JOINT/BURSA W/O US: CPT | Mod: LT

## 2019-11-04 PROCEDURE — 99214 OFFICE O/P EST MOD 30 MIN: CPT | Mod: 25

## 2019-11-04 PROCEDURE — 20550 NJX 1 TENDON SHEATH/LIGAMENT: CPT | Mod: 59

## 2019-11-05 LAB
ALBUMIN SERPL ELPH-MCNC: 4.3 G/DL
ALP BLD-CCNC: 104 U/L
ALT SERPL-CCNC: 21 U/L
ANION GAP SERPL CALC-SCNC: 13 MMOL/L
AST SERPL-CCNC: 19 U/L
BASOPHILS # BLD AUTO: 0.04 K/UL
BASOPHILS NFR BLD AUTO: 0.7 %
BILIRUB SERPL-MCNC: 0.8 MG/DL
BUN SERPL-MCNC: 7 MG/DL
CALCIUM SERPL-MCNC: 9.3 MG/DL
CHLORIDE SERPL-SCNC: 105 MMOL/L
CO2 SERPL-SCNC: 25 MMOL/L
CREAT SERPL-MCNC: 0.6 MG/DL
CRP SERPL-MCNC: 0.32 MG/DL
EOSINOPHIL # BLD AUTO: 0.3 K/UL
EOSINOPHIL NFR BLD AUTO: 5.1 %
ERYTHROCYTE [SEDIMENTATION RATE] IN BLOOD BY WESTERGREN METHOD: 20 MM/HR
GLUCOSE SERPL-MCNC: 171 MG/DL
HCT VFR BLD CALC: 38.6 %
HGB BLD-MCNC: 12.2 G/DL
IMM GRANULOCYTES NFR BLD AUTO: 0.2 %
LYMPHOCYTES # BLD AUTO: 1.41 K/UL
LYMPHOCYTES NFR BLD AUTO: 23.9 %
MAN DIFF?: NORMAL
MCHC RBC-ENTMCNC: 28.6 PG
MCHC RBC-ENTMCNC: 31.6 GM/DL
MCV RBC AUTO: 90.6 FL
MONOCYTES # BLD AUTO: 0.37 K/UL
MONOCYTES NFR BLD AUTO: 6.3 %
NEUTROPHILS # BLD AUTO: 3.78 K/UL
NEUTROPHILS NFR BLD AUTO: 63.8 %
PLATELET # BLD AUTO: 224 K/UL
POTASSIUM SERPL-SCNC: 4.3 MMOL/L
PROT SERPL-MCNC: 6.9 G/DL
RBC # BLD: 4.26 M/UL
RBC # FLD: 13.7 %
SODIUM SERPL-SCNC: 143 MMOL/L
WBC # FLD AUTO: 5.91 K/UL

## 2019-11-06 LAB
CCP AB SER IA-ACNC: <8 UNITS
HLA-B27 RELATED AG QL: NORMAL
RF+CCP IGG SER-IMP: NEGATIVE

## 2019-11-11 LAB — ACE BLD-CCNC: 121 U/L

## 2019-11-11 RX ORDER — METHYLPRED ACET/NACL,ISO-OS/PF 40 MG/ML
40 VIAL (ML) INJECTION
Qty: 1 | Refills: 0 | Status: COMPLETED | OUTPATIENT
Start: 2019-11-11

## 2019-11-11 RX ADMIN — METHYLPREDNISOLONE ACETATE MG/ML: 40 INJECTION, SUSPENSION INTRA-ARTICULAR; INTRALESIONAL; INTRAMUSCULAR; SOFT TISSUE at 00:00

## 2019-11-11 NOTE — HISTORY OF PRESENT ILLNESS
[FreeTextEntry1] : RAPHAEL FLYNN is a 59 year old woman with diffuse OA, Dupuytren contractures in hands, and CTS. Also with R sided medial clavicle fullness with discomfort over the site. \par \par Today with worsening pain over flexor tendon sheath of L index finger with limitation in ability to use finger and TTP over the sheath itself. No improvement with PO medrol dose pack in the past, + Dupuytren in a few digits b/l as well but these are not painful or limiting.\par \par + ongoing R CTS, has not started using a QHS brace but has been doing OT with improvement. \par \par + continued pain/fullness over R clavicle though markedly less s/p medrol dose pack, but remains with protuberant bump. Recent XR remains negative, ?soft tissue mass. Some fullness in hand joints today but no selam synovitis, no AM stiffness in peripheral joints. Prior weight loss has now stabilized, denies F/C, night sweats. \par \par + Hx of OA in b/l knees, L 1st MTP, and b/l shoulder calcific tendinosis all with chronic pain, all stable aside from R knee which has been more painful with ambulation recently. \par \par Labs: ESR 55, Mild TSH elevation, SLE negative. RF 14 but negative by Igs\par XR hands, knees, feet -- OA, chondrocalcinosis -- no inflammatory arthritis evident \par \par Saw rheumatology last year, Dr Carney who felt symptoms more consistent with OA, CTS, dupytrens. SLE, RA, Lyme negative at that time, ESR/CRP negative.

## 2019-11-11 NOTE — PHYSICAL EXAM
[General Appearance - Alert] : alert [General Appearance - In No Acute Distress] : in no acute distress [General Appearance - Well Nourished] : well nourished [Sclera] : the sclera and conjunctiva were normal [PERRL With Normal Accommodation] : pupils were equal in size, round, and reactive to light [Extraocular Movements] : extraocular movements were intact [Outer Ear] : the ears and nose were normal in appearance [Oropharynx] : the oropharynx was normal [Neck Appearance] : the appearance of the neck was normal [Neck Cervical Mass (___cm)] : no neck mass was observed [Thyroid Diffuse Enlargement] : the thyroid was not enlarged [Auscultation Breath Sounds / Voice Sounds] : lungs were clear to auscultation bilaterally [Heart Rate And Rhythm] : heart rate was normal and rhythm regular [Heart Sounds] : normal S1 and S2 [Heart Sounds Gallop] : no gallops [Murmurs] : no murmurs [Heart Sounds Pericardial Friction Rub] : no pericardial rub [Full Pulse] : the pedal pulses are present [Edema] : there was no peripheral edema [Bowel Sounds] : normal bowel sounds [Abdomen Soft] : soft [Abdomen Tenderness] : non-tender [Abdomen Mass (___ Cm)] : no abdominal mass palpated [Cervical Lymph Nodes Enlarged Posterior Bilaterally] : posterior cervical [Cervical Lymph Nodes Enlarged Anterior Bilaterally] : anterior cervical [Supraclavicular Lymph Nodes Enlarged Bilaterally] : supraclavicular [No CVA Tenderness] : no ~M costovertebral angle tenderness [No Spinal Tenderness] : no spinal tenderness [Abnormal Walk] : normal gait [Nail Clubbing] : no clubbing  or cyanosis of the fingernails [Musculoskeletal - Swelling] : no joint swelling seen [Motor Tone] : muscle strength and tone were normal [Skin Color & Pigmentation] : normal skin color and pigmentation [Skin Turgor] : normal skin turgor [] : no rash [Motor Exam] : the motor exam was normal [No Focal Deficits] : no focal deficits [Oriented To Time, Place, And Person] : oriented to person, place, and time [Impaired Insight] : insight and judgment were intact [Affect] : the affect was normal [FreeTextEntry1] : Tinels and Phalens negative

## 2019-11-11 NOTE — PROCEDURE
[Other Date:___] : Date: [unfilled] [FreeTextEntry1] : NOTE - 2 procedures documented\par \par Procedure: L index finger flexor tendon sheath injection\par \par Verbal consent obtained from RAPHAEL FLYNN after discussion of risks/ benefits / alternatives which include but are not limited to pain at injection site, infection, bleeding, skin hypopigmentation. \par \par Site identified, marked and sterilized with Betadine. Ethyl chloride applied for topical anesthetic. 25 g needle inserted and advanced to level of tendon sheath, appropriate positioning verified and needle withdrawn slightly and mixture of 20mg of depomedrol + 0.5cc of lidocaine administered around tendon sheath.Ms. FLYNN tolerated procedure well and there was minimal blood loss.\par \par Procedure: L knee IA injection \par \par Verbal consent obtained from RAPHAEL FLYNN after discussion of risks/ benefits / alternatives which include but are not limited to pain at injection site, infection, bleeding, skin hypopigmentation. \par \par Site identified, marked and sterilized with Betadine. Ethyl chloride applied for topical anesthetic. 21 g needle inserted via medial approach. No fluid able to be aspirated. 40 mg of depomedrol and 2cc of lidocaine injected. Ms. FLYNN tolerated procedure well and there was minimal blood loss. \par \par Post-procedure instructions for both procedures provided to Ms. FLYNN including to avoid strenuous exercise or use of the L hand for the next 48 hours and apply cold compresses to joint q6 hours for next 24 hours. Advised to notify the office and be evaluated at ED/ UC if worsening pain, swelling, warmth, or bleeding from site or if he develops a fever, chills. Pt verbalized understanding.

## 2019-11-11 NOTE — DATA REVIEWED
[FreeTextEntry1] : Labs, recent imaging, prior rheumatology notes reviewed and scanned to EMR. Recent XR and labs from last visit reviewed with patient.

## 2019-11-11 NOTE — ASSESSMENT
[FreeTextEntry1] : RAPHAEL FLYNN is a 59 year old woman with diffuse OA, worse in R knee today s/p IA injection. Also with painful Duyputrens contracture over L index finger, s/p tendon sheath injection today. Tolerated both well. ? synovitis over hands today, and continued R proximal clavicle fullness, without any bony abnormality seen on recent XR. This may be inflammatory vs infectious vs malignant. Recent esophagitis precluding use of NSAIDs. \par \par - c/w OT and PT to improve joint ROM in shoulders and hands most importantly\par - c/w diclofenac gel prn OA pain as would like to avoid PO NSAIDs given esophagitis\par - US sternoclavicular joints to eval abnormal area, XR hands to eval for any changes since last imaging consistent with inflammatory arthritis \par - will expand inflammatory arthritis w/u - check CCP, ACE, HLA B27 as well as routine labs\par - RTC after US of clavicle

## 2019-11-12 ENCOUNTER — APPOINTMENT (OUTPATIENT)
Dept: RHEUMATOLOGY | Facility: CLINIC | Age: 59
End: 2019-11-12

## 2019-11-17 ENCOUNTER — FORM ENCOUNTER (OUTPATIENT)
Age: 59
End: 2019-11-17

## 2019-11-18 ENCOUNTER — OUTPATIENT (OUTPATIENT)
Dept: OUTPATIENT SERVICES | Facility: HOSPITAL | Age: 59
LOS: 1 days | End: 2019-11-18
Payer: MEDICAID

## 2019-11-18 ENCOUNTER — APPOINTMENT (OUTPATIENT)
Dept: RADIOLOGY | Facility: CLINIC | Age: 59
End: 2019-11-18
Payer: MEDICAID

## 2019-11-18 DIAGNOSIS — Z90.710 ACQUIRED ABSENCE OF BOTH CERVIX AND UTERUS: Chronic | ICD-10-CM

## 2019-11-18 DIAGNOSIS — Z98.89 OTHER SPECIFIED POSTPROCEDURAL STATES: Chronic | ICD-10-CM

## 2019-11-18 DIAGNOSIS — Z00.8 ENCOUNTER FOR OTHER GENERAL EXAMINATION: ICD-10-CM

## 2019-11-18 DIAGNOSIS — M67.912 UNSPECIFIED DISORDER OF SYNOVIUM AND TENDON, LEFT SHOULDER: Chronic | ICD-10-CM

## 2019-11-18 DIAGNOSIS — Z98.891 HISTORY OF UTERINE SCAR FROM PREVIOUS SURGERY: Chronic | ICD-10-CM

## 2019-11-18 PROCEDURE — 73130 X-RAY EXAM OF HAND: CPT | Mod: 26,50

## 2019-11-18 PROCEDURE — 73130 X-RAY EXAM OF HAND: CPT

## 2019-11-22 NOTE — ED ADULT NURSE NOTE - DOES PATIENT HAVE ADVANCE DIRECTIVE
PREOPERATIVE DIAGNOSIS:   Left carpal tunnel syndrome.   Left flexor tenosynovitis    POSTOPERATIVE DIAGNOSIS:   Same    PROCEDURE:   Carpal tunnel release.     SURGEON:   Breezy Daniels DO      Assistant:  None    Tourniquet:  250 mmHg X 22 minutes    ANESTHESIA:   Local anesthesia 10 cc 1% lidocaine and 0.25% Marcaine with epinephrine    OPERATIVE INDICATION:   See preoperative orthopedic office notes.  All risks and benefits including but not limited to:  Continued pain, infection, need for further surgery, and neurovascular injury.    OPERATIVE TECHNIQUE:   The patient brought to the operating room and transferred to the operating table.  Patient was placed in the supine position with the affected upper extremity supported on a hand table.  Prior to the incision the area was infiltrated with 10 cc of an equal mixture of 1% lidocaine with epinephrine and 0.25% Marcaine with epinephrine.  This was placed along the carpal tunnel and the distal forearm.  The patient received preoperative antibiotics.  A sterile prep and drape of the right upper extremity was performed in the usual fashion.  A surgical time out was taken, the operative consent was read and the correct operative site was confirmed.  .  The arm was exsanguinated and tourniquet was raised to 250 mmHg.     A 2 cm incision was made beginning at the distal wrist crease and extended distally paralleling the thenar crease.  This was carried down to subcutaneous tissues and down through the palmar aponeurosis.  Then, the transverse carpal ligament was initially released distally.  Then, proximally I used the tenotomy scissors to complete the release.  I performed the surgery with 3.5 loupe magnification.  The median nerve was then inspected and this had a moderate degree of flattening.  There was mild discoloration.  The nerve was gently retracted radially and there was significant degree of hypertrophic flexor tenosynovitis, which was debrided on the ulnar  aspect of the carpal tunnel only while the median nerve could be fully visualized and retracted.  This was traced proximally.  There was significant tenosynovitis along the flexor tendons.  Each tendon was carefully retracted and a formal tenosynovectomy was performed removing an abundant amount of thick tenosynovium.  Once again the median nerve was visualized during this process and the 2 tendons most intimately close/involved with the median nerve were not debrided..  There are no masses within the carpal tunnel.  The wound was then irrigated with normal saline.  The skin was then closed with 3-0 nylon sutures in an interrupted horizontal mattress fashion.  Wound was dressed with Xeroform, bulky dressing, volar splint and secured with an Ace wrap. Tourniquet released and brisk cap refill returned to all fingertips.     No complications.  All sponge and needle counts were correct.  EBL was minimal.  There were no specimens.  The patient was then taken to the recovery room in stable condition.     POSTOPERATIVE COURSE:  Patient was given pain medications and will follow up in 10-14 days for suture removal.  May remove dressing in 5 days and shower.    DICTATING PROVIDER:   Breezy Daniels DO     No

## 2019-11-26 ENCOUNTER — APPOINTMENT (OUTPATIENT)
Dept: RHEUMATOLOGY | Facility: CLINIC | Age: 59
End: 2019-11-26

## 2019-12-05 ENCOUNTER — OUTPATIENT (OUTPATIENT)
Dept: OUTPATIENT SERVICES | Facility: HOSPITAL | Age: 59
LOS: 1 days | End: 2019-12-05
Payer: MEDICAID

## 2019-12-05 ENCOUNTER — APPOINTMENT (OUTPATIENT)
Dept: ULTRASOUND IMAGING | Facility: CLINIC | Age: 59
End: 2019-12-05
Payer: MEDICAID

## 2019-12-05 DIAGNOSIS — Z90.710 ACQUIRED ABSENCE OF BOTH CERVIX AND UTERUS: Chronic | ICD-10-CM

## 2019-12-05 DIAGNOSIS — Z00.8 ENCOUNTER FOR OTHER GENERAL EXAMINATION: ICD-10-CM

## 2019-12-05 DIAGNOSIS — Z98.89 OTHER SPECIFIED POSTPROCEDURAL STATES: Chronic | ICD-10-CM

## 2019-12-05 DIAGNOSIS — M67.912 UNSPECIFIED DISORDER OF SYNOVIUM AND TENDON, LEFT SHOULDER: Chronic | ICD-10-CM

## 2019-12-05 DIAGNOSIS — Z98.891 HISTORY OF UTERINE SCAR FROM PREVIOUS SURGERY: Chronic | ICD-10-CM

## 2019-12-05 PROCEDURE — 76882 US LMTD JT/FCL EVL NVASC XTR: CPT | Mod: 26,LT

## 2019-12-05 PROCEDURE — 76882 US LMTD JT/FCL EVL NVASC XTR: CPT

## 2019-12-16 ENCOUNTER — APPOINTMENT (OUTPATIENT)
Dept: RHEUMATOLOGY | Facility: CLINIC | Age: 59
End: 2019-12-16
Payer: MEDICAID

## 2019-12-16 VITALS
WEIGHT: 150 LBS | HEART RATE: 72 BPM | BODY MASS INDEX: 29.45 KG/M2 | SYSTOLIC BLOOD PRESSURE: 138 MMHG | DIASTOLIC BLOOD PRESSURE: 90 MMHG | OXYGEN SATURATION: 97 % | HEIGHT: 60 IN

## 2019-12-16 DIAGNOSIS — G56.01 CARPAL TUNNEL SYNDROME, RIGHT UPPER LIMB: ICD-10-CM

## 2019-12-16 DIAGNOSIS — M19.042 PRIMARY OSTEOARTHRITIS, RIGHT HAND: ICD-10-CM

## 2019-12-16 DIAGNOSIS — M72.0 PALMAR FASCIAL FIBROMATOSIS [DUPUYTREN]: ICD-10-CM

## 2019-12-16 DIAGNOSIS — M65.9 SYNOVITIS AND TENOSYNOVITIS, UNSPECIFIED: ICD-10-CM

## 2019-12-16 DIAGNOSIS — M19.041 PRIMARY OSTEOARTHRITIS, RIGHT HAND: ICD-10-CM

## 2019-12-16 DIAGNOSIS — M17.0 BILATERAL PRIMARY OSTEOARTHRITIS OF KNEE: ICD-10-CM

## 2019-12-16 DIAGNOSIS — M89.319 HYPERTROPHY OF BONE, UNSPECIFIED SHOULDER: ICD-10-CM

## 2019-12-16 DIAGNOSIS — M25.60 STIFFNESS OF UNSPECIFIED JOINT, NOT ELSEWHERE CLASSIFIED: ICD-10-CM

## 2019-12-16 DIAGNOSIS — M75.40 IMPINGEMENT SYNDROME OF UNSPECIFIED SHOULDER: ICD-10-CM

## 2019-12-16 PROCEDURE — 99214 OFFICE O/P EST MOD 30 MIN: CPT

## 2019-12-30 NOTE — HISTORY OF PRESENT ILLNESS
[FreeTextEntry1] :  # [id number]\par \par RAPHAEL FLYNN is a 59 year old woman with diffuse OA, Dupuytren contractures in hands, and CTS. Also with R sided medial clavicle fullness with discomfort over the site. \par \par Today with worsening pain over flexor tendon sheath of L index finger with limitation in ability to use finger and TTP over the sheath itself. No improvement with PO medrol dose pack in the past, + Dupuytren in a few digits b/l as well but these are not painful or limiting.\par \par + ongoing R CTS, has not started using a QHS brace but has been doing OT with improvement. \par \par + continued pain/fullness over R clavicle though markedly less s/p medrol dose pack, but remains with protuberant bump. Recent XR remains negative, ?soft tissue mass. Some fullness in hand joints today but no selam synovitis, no AM stiffness in peripheral joints. Prior weight loss has now stabilized, denies F/C, night sweats. \par \par + Hx of OA in b/l knees, L 1st MTP, and b/l shoulder calcific tendinosis all with chronic pain, all stable aside from R knee which has been more painful with ambulation recently. \par \par Labs: ESR 55, Mild TSH elevation, SLE negative. RF 14 but negative by Igs\par XR hands, knees, feet -- OA, chondrocalcinosis -- no inflammatory arthritis evident \par \par Saw rheumatology last year, Dr Carney who felt symptoms more consistent with OA, CTS, dupytrens. SLE, RA, Lyme negative at that time, ESR/CRP negative.

## 2019-12-30 NOTE — REASON FOR VISIT
[[employer] ] : provided by [employer]   [Follow-Up: _____] : a [unfilled] follow-up visit [FreeTextEntry1] : [id number] [FreeTextEntry2] : [place] [TWNoteComboBox1] : Pakistani

## 2019-12-30 NOTE — ASSESSMENT
[FreeTextEntry1] : RAPHAEL FLYNN is a 59 year old woman with diffuse OA and painful Duyputrens contracture both improved s/p injections at last visit with increased ability to use. Continued ? of inflammatory arthritis based on exam with synovitis over hands, continued R proximal clavicle fullness, and mild inflammation seen on recent MSK U/S. Pt is seronegative but elevated ACE --?sarcoid. Recent esophagitis precluding use of NSAIDs. Also with worsening neuropathic pain in R arm s/p CTS surgery this year which made symptoms worse and continued median nerve enlargement. \par \par - cock up splint QHS to R wrist for now, advised this might partially improve symptoms but will need neuro re-eval\par - neurology referral \par - trial of prednisone 15mg daily, as suspect an aspect of inflammation that may tie together joint complaints and persistent R CTS. Risks and benefits of steroids were d/w patient including but not limited to weight gain, diabetes, HTN, avascular necrosis, osteoporosis, glaucoma, cataract, infections and immunosuppression. Pt amenable. Advised to take with food given GI issues. \par - c/w diclofenac gel prn OA pain as would like to avoid PO NSAIDs given esophagitis and now steroids\par - hold off on further imaging, will re-eval after steroid trial\par - RTC in 1 month

## 2019-12-30 NOTE — DATA REVIEWED
[FreeTextEntry1] : Labs, recent imaging, prior rheumatology notes reviewed and scanned to EMR. Recent imaging and labs from last visit reviewed with patient.

## 2019-12-30 NOTE — PHYSICAL EXAM
[General Appearance - Alert] : alert [Sclera] : the sclera and conjunctiva were normal [General Appearance - In No Acute Distress] : in no acute distress [PERRL With Normal Accommodation] : pupils were equal in size, round, and reactive to light [General Appearance - Well Nourished] : well nourished [Extraocular Movements] : extraocular movements were intact [Outer Ear] : the ears and nose were normal in appearance [Oropharynx] : the oropharynx was normal [Neck Appearance] : the appearance of the neck was normal [Thyroid Diffuse Enlargement] : the thyroid was not enlarged [Auscultation Breath Sounds / Voice Sounds] : lungs were clear to auscultation bilaterally [Heart Sounds] : normal S1 and S2 [Heart Sounds Gallop] : no gallops [Heart Rate And Rhythm] : heart rate was normal and rhythm regular [Heart Sounds Pericardial Friction Rub] : no pericardial rub [Murmurs] : no murmurs [Edema] : there was no peripheral edema [Full Pulse] : the pedal pulses are present [Bowel Sounds] : normal bowel sounds [Abdomen Tenderness] : non-tender [Abdomen Soft] : soft [Cervical Lymph Nodes Enlarged Anterior Bilaterally] : anterior cervical [Supraclavicular Lymph Nodes Enlarged Bilaterally] : supraclavicular [No CVA Tenderness] : no ~M costovertebral angle tenderness [No Spinal Tenderness] : no spinal tenderness [Abnormal Walk] : normal gait [Musculoskeletal - Swelling] : no joint swelling seen [Motor Tone] : muscle strength and tone were normal [Nail Clubbing] : no clubbing  or cyanosis of the fingernails [Skin Color & Pigmentation] : normal skin color and pigmentation [Motor Exam] : the motor exam was normal [] : no rash [Skin Turgor] : normal skin turgor [No Focal Deficits] : no focal deficits [Oriented To Time, Place, And Person] : oriented to person, place, and time [Affect] : the affect was normal [Impaired Insight] : insight and judgment were intact [FreeTextEntry1] : + heberdens and brouchards nodes diffusely with ?mild soft tissue fullness over scattered MCPs, and PIPs, L index finger DIP node mildly tender, improved TTP over this flexor tendon and with flexion of this finger. + dupytrens contractures in b/l hands, remainder nontender. R proximal clavicle with fullness and protrusion but no TTP or erythema. B/l knees with crepitus. Limited active and passive ROM in shoulders, R >L and pain over R arm with active use but no TTP

## 2020-01-14 ENCOUNTER — RX RENEWAL (OUTPATIENT)
Age: 60
End: 2020-01-14

## 2020-01-16 ENCOUNTER — RX RENEWAL (OUTPATIENT)
Age: 60
End: 2020-01-16

## 2020-01-16 RX ORDER — PREDNISONE 5 MG/1
5 TABLET ORAL
Qty: 90 | Refills: 0 | Status: ACTIVE | COMMUNITY
Start: 2019-12-16 | End: 1900-01-01

## 2020-02-10 ENCOUNTER — APPOINTMENT (OUTPATIENT)
Dept: RHEUMATOLOGY | Facility: CLINIC | Age: 60
End: 2020-02-10

## 2022-03-07 ENCOUNTER — APPOINTMENT (OUTPATIENT)
Dept: NEUROLOGY | Facility: CLINIC | Age: 62
End: 2022-03-07

## 2023-05-05 NOTE — ASU PATIENT PROFILE, ADULT - TEACHING/LEARNING FACTORS IMPACT ABILITY TO LEARN
Patient weight 19.11 today-last visit 19.13-pt having gastro issues with [facility] and he has stomach aches, vomiting, dark and pastry stool 3-4 movements day-pt's mom calling pt's GI -can you prescribe anything for nausea and stomach issues or OTC med to get  
communication limitations/anxiety/Moroccan but speaks english well

## 2023-10-16 NOTE — H&P PST ADULT - MEDICATION HERBAL REMEDIES, PROFILE
Rifampin Counseling: I discussed with the patient the risks of rifampin including but not limited to liver damage, kidney damage, red-orange body fluids, nausea/vomiting and severe allergy. no

## 2024-01-13 ENCOUNTER — EMERGENCY (EMERGENCY)
Facility: HOSPITAL | Age: 64
LOS: 1 days | Discharge: HOME | End: 2024-01-13
Attending: EMERGENCY MEDICINE
Payer: MEDICAID

## 2024-01-13 VITALS
SYSTOLIC BLOOD PRESSURE: 155 MMHG | TEMPERATURE: 99 F | OXYGEN SATURATION: 97 % | HEART RATE: 93 BPM | RESPIRATION RATE: 19 BRPM | DIASTOLIC BLOOD PRESSURE: 92 MMHG

## 2024-01-13 DIAGNOSIS — Z90.710 ACQUIRED ABSENCE OF BOTH CERVIX AND UTERUS: Chronic | ICD-10-CM

## 2024-01-13 DIAGNOSIS — Z98.891 HISTORY OF UTERINE SCAR FROM PREVIOUS SURGERY: Chronic | ICD-10-CM

## 2024-01-13 DIAGNOSIS — M67.912 UNSPECIFIED DISORDER OF SYNOVIUM AND TENDON, LEFT SHOULDER: Chronic | ICD-10-CM

## 2024-01-13 DIAGNOSIS — Z98.89 OTHER SPECIFIED POSTPROCEDURAL STATES: Chronic | ICD-10-CM

## 2024-01-13 LAB
ALBUMIN SERPL ELPH-MCNC: 4 G/DL — SIGNIFICANT CHANGE UP (ref 3.3–5.2)
ALBUMIN SERPL ELPH-MCNC: 4 G/DL — SIGNIFICANT CHANGE UP (ref 3.3–5.2)
ALP SERPL-CCNC: 85 U/L — SIGNIFICANT CHANGE UP (ref 40–120)
ALP SERPL-CCNC: 85 U/L — SIGNIFICANT CHANGE UP (ref 40–120)
ALT FLD-CCNC: 14 U/L — SIGNIFICANT CHANGE UP
ALT FLD-CCNC: 14 U/L — SIGNIFICANT CHANGE UP
ANION GAP SERPL CALC-SCNC: 12 MMOL/L — SIGNIFICANT CHANGE UP (ref 5–17)
ANION GAP SERPL CALC-SCNC: 12 MMOL/L — SIGNIFICANT CHANGE UP (ref 5–17)
APTT BLD: 29.5 SEC — SIGNIFICANT CHANGE UP (ref 24.5–35.6)
APTT BLD: 29.5 SEC — SIGNIFICANT CHANGE UP (ref 24.5–35.6)
AST SERPL-CCNC: 15 U/L — SIGNIFICANT CHANGE UP
AST SERPL-CCNC: 15 U/L — SIGNIFICANT CHANGE UP
BASOPHILS # BLD AUTO: 0.03 K/UL — SIGNIFICANT CHANGE UP (ref 0–0.2)
BASOPHILS # BLD AUTO: 0.03 K/UL — SIGNIFICANT CHANGE UP (ref 0–0.2)
BASOPHILS NFR BLD AUTO: 0.4 % — SIGNIFICANT CHANGE UP (ref 0–2)
BASOPHILS NFR BLD AUTO: 0.4 % — SIGNIFICANT CHANGE UP (ref 0–2)
BILIRUB SERPL-MCNC: 0.9 MG/DL — SIGNIFICANT CHANGE UP (ref 0.4–2)
BILIRUB SERPL-MCNC: 0.9 MG/DL — SIGNIFICANT CHANGE UP (ref 0.4–2)
BUN SERPL-MCNC: 7.5 MG/DL — LOW (ref 8–20)
BUN SERPL-MCNC: 7.5 MG/DL — LOW (ref 8–20)
CALCIUM SERPL-MCNC: 9.1 MG/DL — SIGNIFICANT CHANGE UP (ref 8.4–10.5)
CALCIUM SERPL-MCNC: 9.1 MG/DL — SIGNIFICANT CHANGE UP (ref 8.4–10.5)
CHLORIDE SERPL-SCNC: 103 MMOL/L — SIGNIFICANT CHANGE UP (ref 96–108)
CHLORIDE SERPL-SCNC: 103 MMOL/L — SIGNIFICANT CHANGE UP (ref 96–108)
CO2 SERPL-SCNC: 24 MMOL/L — SIGNIFICANT CHANGE UP (ref 22–29)
CO2 SERPL-SCNC: 24 MMOL/L — SIGNIFICANT CHANGE UP (ref 22–29)
CREAT SERPL-MCNC: 0.71 MG/DL — SIGNIFICANT CHANGE UP (ref 0.5–1.3)
CREAT SERPL-MCNC: 0.71 MG/DL — SIGNIFICANT CHANGE UP (ref 0.5–1.3)
D DIMER BLD IA.RAPID-MCNC: 207 NG/ML DDU — SIGNIFICANT CHANGE UP
D DIMER BLD IA.RAPID-MCNC: 207 NG/ML DDU — SIGNIFICANT CHANGE UP
EGFR: 97 ML/MIN/1.73M2 — SIGNIFICANT CHANGE UP
EGFR: 97 ML/MIN/1.73M2 — SIGNIFICANT CHANGE UP
EOSINOPHIL # BLD AUTO: 0.19 K/UL — SIGNIFICANT CHANGE UP (ref 0–0.5)
EOSINOPHIL # BLD AUTO: 0.19 K/UL — SIGNIFICANT CHANGE UP (ref 0–0.5)
EOSINOPHIL NFR BLD AUTO: 2.3 % — SIGNIFICANT CHANGE UP (ref 0–6)
EOSINOPHIL NFR BLD AUTO: 2.3 % — SIGNIFICANT CHANGE UP (ref 0–6)
GLUCOSE BLDC GLUCOMTR-MCNC: 125 MG/DL — HIGH (ref 70–99)
GLUCOSE BLDC GLUCOMTR-MCNC: 125 MG/DL — HIGH (ref 70–99)
GLUCOSE SERPL-MCNC: 94 MG/DL — SIGNIFICANT CHANGE UP (ref 70–99)
GLUCOSE SERPL-MCNC: 94 MG/DL — SIGNIFICANT CHANGE UP (ref 70–99)
HCT VFR BLD CALC: 39.7 % — SIGNIFICANT CHANGE UP (ref 34.5–45)
HCT VFR BLD CALC: 39.7 % — SIGNIFICANT CHANGE UP (ref 34.5–45)
HGB BLD-MCNC: 13.5 G/DL — SIGNIFICANT CHANGE UP (ref 11.5–15.5)
HGB BLD-MCNC: 13.5 G/DL — SIGNIFICANT CHANGE UP (ref 11.5–15.5)
IMM GRANULOCYTES NFR BLD AUTO: 0.1 % — SIGNIFICANT CHANGE UP (ref 0–0.9)
IMM GRANULOCYTES NFR BLD AUTO: 0.1 % — SIGNIFICANT CHANGE UP (ref 0–0.9)
INR BLD: 0.98 RATIO — SIGNIFICANT CHANGE UP (ref 0.85–1.18)
INR BLD: 0.98 RATIO — SIGNIFICANT CHANGE UP (ref 0.85–1.18)
LYMPHOCYTES # BLD AUTO: 1.51 K/UL — SIGNIFICANT CHANGE UP (ref 1–3.3)
LYMPHOCYTES # BLD AUTO: 1.51 K/UL — SIGNIFICANT CHANGE UP (ref 1–3.3)
LYMPHOCYTES # BLD AUTO: 18.3 % — SIGNIFICANT CHANGE UP (ref 13–44)
LYMPHOCYTES # BLD AUTO: 18.3 % — SIGNIFICANT CHANGE UP (ref 13–44)
MAGNESIUM SERPL-MCNC: 2 MG/DL — SIGNIFICANT CHANGE UP (ref 1.8–2.6)
MAGNESIUM SERPL-MCNC: 2 MG/DL — SIGNIFICANT CHANGE UP (ref 1.8–2.6)
MCHC RBC-ENTMCNC: 30.3 PG — SIGNIFICANT CHANGE UP (ref 27–34)
MCHC RBC-ENTMCNC: 30.3 PG — SIGNIFICANT CHANGE UP (ref 27–34)
MCHC RBC-ENTMCNC: 34 GM/DL — SIGNIFICANT CHANGE UP (ref 32–36)
MCHC RBC-ENTMCNC: 34 GM/DL — SIGNIFICANT CHANGE UP (ref 32–36)
MCV RBC AUTO: 89.2 FL — SIGNIFICANT CHANGE UP (ref 80–100)
MCV RBC AUTO: 89.2 FL — SIGNIFICANT CHANGE UP (ref 80–100)
MONOCYTES # BLD AUTO: 0.52 K/UL — SIGNIFICANT CHANGE UP (ref 0–0.9)
MONOCYTES # BLD AUTO: 0.52 K/UL — SIGNIFICANT CHANGE UP (ref 0–0.9)
MONOCYTES NFR BLD AUTO: 6.3 % — SIGNIFICANT CHANGE UP (ref 2–14)
MONOCYTES NFR BLD AUTO: 6.3 % — SIGNIFICANT CHANGE UP (ref 2–14)
NEUTROPHILS # BLD AUTO: 6.01 K/UL — SIGNIFICANT CHANGE UP (ref 1.8–7.4)
NEUTROPHILS # BLD AUTO: 6.01 K/UL — SIGNIFICANT CHANGE UP (ref 1.8–7.4)
NEUTROPHILS NFR BLD AUTO: 72.6 % — SIGNIFICANT CHANGE UP (ref 43–77)
NEUTROPHILS NFR BLD AUTO: 72.6 % — SIGNIFICANT CHANGE UP (ref 43–77)
NT-PROBNP SERPL-SCNC: <36 PG/ML — SIGNIFICANT CHANGE UP (ref 0–300)
NT-PROBNP SERPL-SCNC: <36 PG/ML — SIGNIFICANT CHANGE UP (ref 0–300)
PLATELET # BLD AUTO: 219 K/UL — SIGNIFICANT CHANGE UP (ref 150–400)
PLATELET # BLD AUTO: 219 K/UL — SIGNIFICANT CHANGE UP (ref 150–400)
POTASSIUM SERPL-MCNC: 3.7 MMOL/L — SIGNIFICANT CHANGE UP (ref 3.5–5.3)
POTASSIUM SERPL-MCNC: 3.7 MMOL/L — SIGNIFICANT CHANGE UP (ref 3.5–5.3)
POTASSIUM SERPL-SCNC: 3.7 MMOL/L — SIGNIFICANT CHANGE UP (ref 3.5–5.3)
POTASSIUM SERPL-SCNC: 3.7 MMOL/L — SIGNIFICANT CHANGE UP (ref 3.5–5.3)
PROT SERPL-MCNC: 7.2 G/DL — SIGNIFICANT CHANGE UP (ref 6.6–8.7)
PROT SERPL-MCNC: 7.2 G/DL — SIGNIFICANT CHANGE UP (ref 6.6–8.7)
PROTHROM AB SERPL-ACNC: 10.9 SEC — SIGNIFICANT CHANGE UP (ref 9.5–13)
PROTHROM AB SERPL-ACNC: 10.9 SEC — SIGNIFICANT CHANGE UP (ref 9.5–13)
RBC # BLD: 4.45 M/UL — SIGNIFICANT CHANGE UP (ref 3.8–5.2)
RBC # BLD: 4.45 M/UL — SIGNIFICANT CHANGE UP (ref 3.8–5.2)
RBC # FLD: 12.8 % — SIGNIFICANT CHANGE UP (ref 10.3–14.5)
RBC # FLD: 12.8 % — SIGNIFICANT CHANGE UP (ref 10.3–14.5)
SODIUM SERPL-SCNC: 139 MMOL/L — SIGNIFICANT CHANGE UP (ref 135–145)
SODIUM SERPL-SCNC: 139 MMOL/L — SIGNIFICANT CHANGE UP (ref 135–145)
TROPONIN T, HIGH SENSITIVITY RESULT: 8 NG/L — SIGNIFICANT CHANGE UP (ref 0–51)
TROPONIN T, HIGH SENSITIVITY RESULT: 8 NG/L — SIGNIFICANT CHANGE UP (ref 0–51)
TROPONIN T, HIGH SENSITIVITY RESULT: 9 NG/L — SIGNIFICANT CHANGE UP (ref 0–51)
WBC # BLD: 8.27 K/UL — SIGNIFICANT CHANGE UP (ref 3.8–10.5)
WBC # BLD: 8.27 K/UL — SIGNIFICANT CHANGE UP (ref 3.8–10.5)
WBC # FLD AUTO: 8.27 K/UL — SIGNIFICANT CHANGE UP (ref 3.8–10.5)
WBC # FLD AUTO: 8.27 K/UL — SIGNIFICANT CHANGE UP (ref 3.8–10.5)

## 2024-01-13 PROCEDURE — 93010 ELECTROCARDIOGRAM REPORT: CPT

## 2024-01-13 PROCEDURE — 71275 CT ANGIOGRAPHY CHEST: CPT | Mod: 26,MA

## 2024-01-13 PROCEDURE — 73030 X-RAY EXAM OF SHOULDER: CPT | Mod: 26,RT

## 2024-01-13 PROCEDURE — 71046 X-RAY EXAM CHEST 2 VIEWS: CPT | Mod: 26

## 2024-01-13 PROCEDURE — 99223 1ST HOSP IP/OBS HIGH 75: CPT

## 2024-01-13 PROCEDURE — 74174 CTA ABD&PLVS W/CONTRAST: CPT | Mod: 26,MA

## 2024-01-13 RX ORDER — NITROGLYCERIN 6.5 MG
0.4 CAPSULE, EXTENDED RELEASE ORAL ONCE
Refills: 0 | Status: COMPLETED | OUTPATIENT
Start: 2024-01-13 | End: 2024-01-13

## 2024-01-13 RX ORDER — PANTOPRAZOLE SODIUM 20 MG/1
40 TABLET, DELAYED RELEASE ORAL DAILY
Refills: 0 | Status: DISCONTINUED | OUTPATIENT
Start: 2024-01-13 | End: 2024-01-20

## 2024-01-13 RX ORDER — INSULIN LISPRO 100/ML
VIAL (ML) SUBCUTANEOUS
Refills: 0 | Status: DISCONTINUED | OUTPATIENT
Start: 2024-01-13 | End: 2024-01-20

## 2024-01-13 RX ORDER — FAMOTIDINE 10 MG/ML
20 INJECTION INTRAVENOUS ONCE
Refills: 0 | Status: COMPLETED | OUTPATIENT
Start: 2024-01-13 | End: 2024-01-13

## 2024-01-13 RX ORDER — DEXTROSE 50 % IN WATER 50 %
15 SYRINGE (ML) INTRAVENOUS ONCE
Refills: 0 | Status: DISCONTINUED | OUTPATIENT
Start: 2024-01-13 | End: 2024-01-20

## 2024-01-13 RX ORDER — LOSARTAN POTASSIUM 100 MG/1
100 TABLET, FILM COATED ORAL DAILY
Refills: 0 | Status: DISCONTINUED | OUTPATIENT
Start: 2024-01-13 | End: 2024-01-20

## 2024-01-13 RX ORDER — DEXTROSE 50 % IN WATER 50 %
25 SYRINGE (ML) INTRAVENOUS ONCE
Refills: 0 | Status: DISCONTINUED | OUTPATIENT
Start: 2024-01-13 | End: 2024-01-20

## 2024-01-13 RX ORDER — LATANOPROST 0.05 MG/ML
1 SOLUTION/ DROPS OPHTHALMIC; TOPICAL AT BEDTIME
Refills: 0 | Status: DISCONTINUED | OUTPATIENT
Start: 2024-01-13 | End: 2024-01-20

## 2024-01-13 RX ORDER — FAMOTIDINE 10 MG/ML
20 INJECTION INTRAVENOUS DAILY
Refills: 0 | Status: DISCONTINUED | OUTPATIENT
Start: 2024-01-13 | End: 2024-01-13

## 2024-01-13 RX ORDER — ATORVASTATIN CALCIUM 80 MG/1
10 TABLET, FILM COATED ORAL AT BEDTIME
Refills: 0 | Status: DISCONTINUED | OUTPATIENT
Start: 2024-01-13 | End: 2024-01-20

## 2024-01-13 RX ORDER — METHOCARBAMOL 500 MG/1
750 TABLET, FILM COATED ORAL EVERY 6 HOURS
Refills: 0 | Status: DISCONTINUED | OUTPATIENT
Start: 2024-01-13 | End: 2024-01-20

## 2024-01-13 RX ORDER — SODIUM CHLORIDE 9 MG/ML
1000 INJECTION, SOLUTION INTRAVENOUS
Refills: 0 | Status: DISCONTINUED | OUTPATIENT
Start: 2024-01-13 | End: 2024-01-20

## 2024-01-13 RX ORDER — GLUCAGON INJECTION, SOLUTION 0.5 MG/.1ML
1 INJECTION, SOLUTION SUBCUTANEOUS ONCE
Refills: 0 | Status: DISCONTINUED | OUTPATIENT
Start: 2024-01-13 | End: 2024-01-20

## 2024-01-13 RX ORDER — ASPIRIN/CALCIUM CARB/MAGNESIUM 324 MG
162 TABLET ORAL ONCE
Refills: 0 | Status: COMPLETED | OUTPATIENT
Start: 2024-01-13 | End: 2024-01-13

## 2024-01-13 RX ORDER — METFORMIN HYDROCHLORIDE 850 MG/1
2 TABLET ORAL
Qty: 0 | Refills: 0 | DISCHARGE

## 2024-01-13 RX ORDER — ACETAMINOPHEN 500 MG
650 TABLET ORAL EVERY 6 HOURS
Refills: 0 | Status: DISCONTINUED | OUTPATIENT
Start: 2024-01-13 | End: 2024-01-14

## 2024-01-13 RX ORDER — DAPAGLIFLOZIN 10 MG/1
1 TABLET, FILM COATED ORAL
Refills: 0 | DISCHARGE

## 2024-01-13 RX ORDER — DEXTROSE 50 % IN WATER 50 %
12.5 SYRINGE (ML) INTRAVENOUS ONCE
Refills: 0 | Status: DISCONTINUED | OUTPATIENT
Start: 2024-01-13 | End: 2024-01-20

## 2024-01-13 RX ORDER — OXYCODONE HYDROCHLORIDE 5 MG/1
5 TABLET ORAL EVERY 6 HOURS
Refills: 0 | Status: DISCONTINUED | OUTPATIENT
Start: 2024-01-13 | End: 2024-01-14

## 2024-01-13 RX ORDER — LOSARTAN POTASSIUM 100 MG/1
1 TABLET, FILM COATED ORAL
Qty: 0 | Refills: 0 | DISCHARGE

## 2024-01-13 RX ORDER — MORPHINE SULFATE 50 MG/1
4 CAPSULE, EXTENDED RELEASE ORAL ONCE
Refills: 0 | Status: DISCONTINUED | OUTPATIENT
Start: 2024-01-13 | End: 2024-01-13

## 2024-01-13 RX ORDER — INSULIN GLARGINE 100 [IU]/ML
20 INJECTION, SOLUTION SUBCUTANEOUS
Refills: 0 | DISCHARGE

## 2024-01-13 RX ORDER — INSULIN LISPRO 100/ML
30 VIAL (ML) SUBCUTANEOUS
Qty: 0 | Refills: 0 | DISCHARGE

## 2024-01-13 RX ORDER — ONDANSETRON 8 MG/1
4 TABLET, FILM COATED ORAL ONCE
Refills: 0 | Status: COMPLETED | OUTPATIENT
Start: 2024-01-13 | End: 2024-01-13

## 2024-01-13 RX ORDER — INSULIN GLARGINE 100 [IU]/ML
20 INJECTION, SOLUTION SUBCUTANEOUS EVERY MORNING
Refills: 0 | Status: DISCONTINUED | OUTPATIENT
Start: 2024-01-14 | End: 2024-01-14

## 2024-01-13 RX ORDER — INSULIN GLARGINE 100 [IU]/ML
30 INJECTION, SOLUTION SUBCUTANEOUS
Qty: 0 | Refills: 0 | DISCHARGE

## 2024-01-13 RX ADMIN — METHOCARBAMOL 750 MILLIGRAM(S): 500 TABLET, FILM COATED ORAL at 19:11

## 2024-01-13 RX ADMIN — OXYCODONE HYDROCHLORIDE 5 MILLIGRAM(S): 5 TABLET ORAL at 20:03

## 2024-01-13 RX ADMIN — Medication 30 MILLILITER(S): at 16:11

## 2024-01-13 RX ADMIN — MORPHINE SULFATE 4 MILLIGRAM(S): 50 CAPSULE, EXTENDED RELEASE ORAL at 22:09

## 2024-01-13 RX ADMIN — LATANOPROST 1 DROP(S): 0.05 SOLUTION/ DROPS OPHTHALMIC; TOPICAL at 23:47

## 2024-01-13 RX ADMIN — MORPHINE SULFATE 4 MILLIGRAM(S): 50 CAPSULE, EXTENDED RELEASE ORAL at 22:31

## 2024-01-13 RX ADMIN — ATORVASTATIN CALCIUM 10 MILLIGRAM(S): 80 TABLET, FILM COATED ORAL at 22:11

## 2024-01-13 RX ADMIN — MORPHINE SULFATE 4 MILLIGRAM(S): 50 CAPSULE, EXTENDED RELEASE ORAL at 15:25

## 2024-01-13 RX ADMIN — FAMOTIDINE 20 MILLIGRAM(S): 10 INJECTION INTRAVENOUS at 16:11

## 2024-01-13 RX ADMIN — Medication 0.4 MILLIGRAM(S): at 13:10

## 2024-01-13 RX ADMIN — ONDANSETRON 4 MILLIGRAM(S): 8 TABLET, FILM COATED ORAL at 22:10

## 2024-01-13 RX ADMIN — OXYCODONE HYDROCHLORIDE 5 MILLIGRAM(S): 5 TABLET ORAL at 21:52

## 2024-01-13 NOTE — ED CDU PROVIDER INITIAL DAY NOTE - CLINICAL SUMMARY MEDICAL DECISION MAKING FREE TEXT BOX
64yo F PMHx DM, HTN, HLD, CVA presented to ED c/o mid-sternal chest pain, waxing and waning in intensity x 3 days. Reports pain radiates to neck, right shoulder, axilla and back and is worse with movement but non-exertional. Negative stress and echo ~1 month ago with cardiologist Dr. Paulson. ED workup reviewed, trop 8, 9, d-dimer 207. CTA dissection study negative, revealing incidental findings of 3.0 x 1.3 cm right breast nodule with calcification and gastric fundus thickening/gastritis. Pt evaluated by Dr. Sarmiento cardiology who is recommending trending 3rd trop despite no delta on first two and checking TTE. If unremarkable can be discharged from cardiology perspective. pt accepted to observation unit for continued management 64yo F PMHx DM, HTN, HLD, CVA presented to ED c/o mid-sternal chest pain, waxing and waning in intensity x 3 days. Reports pain radiates to neck, right shoulder, axilla and back and is worse with movement but non-exertional. Negative stress and echo ~1 month ago with cardiologist Dr. Paulson. ED workup reviewed, trop 8, 9, d-dimer 207. CTA dissection study negative, revealing incidental findings of 3.0 x 1.3 cm right breast nodule with calcification and gastric fundus thickening/gastritis. pt already knows about breast nodule and had area biopsied recently. Pt evaluated by Dr. Sarmiento cardiology who is recommending trending 3rd trop despite no delta on first two and checking TTE. If unremarkable can be discharged from cardiology perspective. pt accepted to observation unit for continued management

## 2024-01-13 NOTE — ED PROVIDER NOTE - NSICDXFAMILYHX_GEN_ALL_CORE_FT
FAMILY HISTORY:  Father  Still living? Unknown  Family history of heart disease, Age at diagnosis: Age Unknown    Mother  Still living? Unknown  Family history of colon cancer, Age at diagnosis: Age Unknown

## 2024-01-13 NOTE — ED CDU PROVIDER INITIAL DAY NOTE - PHYSICAL EXAMINATION
Gen: No acute distress, non toxic  Head: NCAT  Eyes: pink conjunctivae, EOMI, PERRL  CV: RRR, nl s1/s2.  Resp: CTAB, normal rate and effort  GI: Abdomen soft, NT, ND. No rebound, no guarding  : No CVAT  Neuro: A&O x 3, CN II-XII intact, sensorimotor intact without deficits   MSK: +TTP across chest wall. +Pain with active flexion and abduction R shoulder.   Skin: No rashes. intact and perfused.

## 2024-01-13 NOTE — CONSULT NOTE ADULT - NS ATTEST RISK PROBLEM GEN_ALL_CORE FT
Severe diffuse, pleuritic chest pain radiating to the right shoulder and back x 3 days and associated dyspnea.

## 2024-01-13 NOTE — CONSULT NOTE ADULT - ASSESSMENT
Patient is a 63y old  Female with h/o DM, HTN, HLD, s/p CVA, left shoulder impingement, righ tcarpal tunnel syndrome of right wrist, osteoarthritis of right finger, s/p cholecystectomy 25 yrs ago, s/p  x 2, s/p hysterectomy in , left rotator cuff injury, who presents on 2024 with progressively worsening mid sternal chest pain now radiating to the back diffusely and associated with dyspnea.  Patient's chest wall his tender to touch and worsening with deep inspiration. Patient also has had worsening of right arm/axillary pain and has difficulty lifting due to increased pain.  Patient has had no fevers, chills, cough, nasal congestion, myalgias, arthralgias, or COVID-19 infection.    Assessment/Plan:   1.  Admitted 2024 with progressively worsening mid to diffuse chest with pleuritic component with radiation to the back and right arm/axillary/shoulder-physical exam is notable for anterior chest wall tenderness with 2 + radial pulses bilaterally, and clear lung fields, with no lower extremity-12 lead ECG demonstrates sinus rhythm with poor R wave progression across the precordium with no acute ST or T wave abnormalities.    Patient's atypical chest pain is of an unclear etiology and is associated with severe back and right arm/axillary, and shoulder pain-D-dimer was negative.    -Send serial troponin I now, 3, and 6 hours.    -Chest CT angio with and with out IV contrast to rule out thoracic aortic dissection and/or pulmonary emboli  -Consider empiric IV protonix 40 mg, for gastritis and/or esophagitis.  -STAT echo to rule out LV wall abnormalities.    -Patient may also benefit from radiological imaging of right shoulder, due to severe pain and increased difficulty lifting right arm.  -If cardiac testing is unremarkable and patient's symptoms resolve, she will be stable from a cardiac viewpoint for discharge.

## 2024-01-13 NOTE — ED PROVIDER NOTE - OBJECTIVE STATEMENT
64 yo F pmh t2dm, HTN, HLD, CVA not on AC  pw midsternal chest pressure, fluctuating in intensity, radiatins into neck and back and right arm. Today with nausea and son which she attributes to the pain. Pain is pleuritic but not exertional. Saw cardiologist 1 month ago ([facility]), had stress/echo 1 month ago which was reportedly normal. no recent travel, no hx dvt/pe, no malignancy, no hemotypsis  Taking Tylenol [id number] without relief. offered interpretation services, declined. son at bedside.    Meds: basaglar, admelog, lantus, trulicity, farsigna  5mg, 64 yo F pmh t2dm, HTN, HLD, CVA not on AC  pw midsternal chest pressure, fluctuating in intensity, radiatins into neck and back and right arm. Today with nausea and son which she attributes to the pain. Pain is pleuritic but not exertional. Saw cardiologist 1 month ago ([facility]), had stress/echo 1 month ago which was reportedly normal. no recent travel, no hx dvt/pe, no malignancy, no hemotypsis  Taking Tylenol [id number] without relief. offered interpretation services, declined. son at bedside.    Meds: basaglar, admelog, lantus, trulicity, farsigna  5mg,

## 2024-01-13 NOTE — ED PROVIDER NOTE - NSICDXPASTMEDICALHX_GEN_ALL_CORE_FT
PAST MEDICAL HISTORY:  Carpal tunnel syndrome of right wrist     Cerebrovascular accident     Diabetes     HLD (hyperlipidemia)     HTN (hypertension)     OA (osteoarthritis) of finger, right     Shoulder impingement, left

## 2024-01-13 NOTE — CONSULT NOTE ADULT - SUBJECTIVE AND OBJECTIVE BOX
Patient is a 63y old  Female with h/o DM, HTN, HLD, s/p CVA, left shoulder impingement, righ tcarpal tunnel syndrome of right wrist, osteoarthritis of right finger, s/p cholecystectomy 25 yrs ago, s/p  x 2, s/p hysterectomy in , left rotator cuff injury, who presents with progressively worsening mid sternal chest pain now radiating to the back diffusely and associated with dyspnea.  Patient's chest wall his tender to touch and worsening with deep inspiration. Patient also has had worsening of right arm/axillary pain and has difficulty lifting due to increased pain.  Patient has had no fevers, chills, cough, nasal congestion, myalgias, arthralgias, or COVID-19 infection.      PAST MEDICAL & SURGICAL HISTORY:  Diabetes      Cerebrovascular accident      HTN (hypertension)      Shoulder impingement, left      HLD (hyperlipidemia)      Carpal tunnel syndrome of right wrist      OA (osteoarthritis) of finger, right      History of cholecystectomy  25 yrs ago      H/O:  section  2x      H/O: hysterectomy  2003      Disorder of left rotator cuff          HPI:                   MEDICATIONS  (STANDING):  morphine  - Injectable 4 milliGRAM(s) IV Push Once    MEDICATIONS  (PRN):      FAMILY HISTORY:  Family history of colon cancer (Mother)    Family history of heart disease (Father)        SOCIAL HISTORY:    CIGARETTES:    ALCOHOL:    REVIEW OF SYSTEMS:    CONSTITUTIONAL: No fever, weight loss, chills, shakes, or fatigue  EYES: No eye pain, visual disturbances, or discharge  ENMT:  No difficulty hearing, tinnitus, vertigo; No sinus or throat pain  NECK: No pain or stiffness  BREASTS: No pain, masses, or nipple discharge  RESPIRATORY: No cough, wheezing, hemoptysis, or shortness of breath  CARDIOVASCULAR: Diffuse pleuritic chest pain and dyspnea if a 3 day duration.  She has no palpitations, dizziness, syncope, paroxysmal nocturnal dyspnea, orthopnea, or arm or leg swelling  GASTROINTESTINAL: No abdominal  or epigastric pain, nausea, vomiting, hematemesis, diarrhea, constipation, melena or bright red blood.  GENITOURINARY: No dysuria, nocturia, hematuria, or urinary incontinence  NEUROLOGICAL: No headaches, memory loss, slurred speech, limb weakness, loss of strength, numbness, or tremors  SKIN: No itching, burning, rashes, or lesions   LYMPH NODES: No enlarged glands  ENDOCRINE: No heat or cold intolerance, or hair loss  MUSCULOSKELETAL: right arm, axillary, shoulder and back pain x 3 days.  HEME/LYMPH: No easy bruising or bleeding gums       Vital Signs Last 24 Hrs  T(C): 37.1 (2024 11:36), Max: 37.1 (2024 11:36)  T(F): 98.8 (2024 11:36), Max: 98.8 (2024 11:36)  HR: 93 (2024 11:36) (93 - 93)  BP: 150/99 (2024 13:08) (150/99 - 155/92)  BP(mean): --  RR: 19 (2024 11:36) (19 - 19)  SpO2: 97% (2024 11:36) (97% - 97%)    Parameters below as of 2024 11:36  Patient On (Oxygen Delivery Method): room air        PHYSICAL EXAM:    GENERAL: In no apparent distress,  and hydrated.  HEAD:  Atraumatic, Normocephalic  EYES: EOMI, PERRLA, conjunctiva and sclera clear  ENMT: No tonsillar erythema, exudates, or enlargement; Moist mucous membranes, Good dentition, No lesions  NECK: Supple and normal thyroid.  No JVD or carotid bruit.  Carotid pulse is 2+ bilaterally.  Chest:  Chest anterior wall is tender to touch.  HEART: Regular rate and rhythm; No murmurs, rubs, or gallops.  PULMONARY: Clear to auscultation and perfusion.  No rales, wheezing, or rhonchi bilaterally.  ABDOMEN: Soft, Nontender, Nondistended; Bowel sounds present  EXTREMITIES:   No clubbing, cyanosis, or edema  LYMPH: No lymphadenopathy noted  NEUROLOGICAL: Grossly nonfocal               ECG:  Sinus rhythm with HR=94 bpm, left physiological axis, LVH with repolarization changes, early repolarization chagnes in the precordial leads, poor R wave progression across the precordium, and possible old AWMI.    I&O's Detail      LABS:                        13.5   8.27  )-----------( 219      ( 2024 12:30 )             39.7     0113    139  |  103  |  7.5<L>  ----------------------------<  94  3.7   |  24.0  |  0.71    Ca    9.1      2024 14:11  Mg     2.0     -    TPro  7.2  /  Alb  4.0  /  TBili  0.9  /  DBili  x   /  AST  15  /  ALT  14  /  AlkPhos  85  -        PT/INR - ( 2024 12:30 )   PT: 10.9 sec;   INR: 0.98 ratio         PTT - ( 2024 12:30 )  PTT:29.5 sec  Urinalysis Basic - ( 2024 14:11 )    Color: x / Appearance: x / SG: x / pH: x  Gluc: 94 mg/dL / Ketone: x  / Bili: x / Urobili: x   Blood: x / Protein: x / Nitrite: x   Leuk Esterase: x / RBC: x / WBC x   Sq Epi: x / Non Sq Epi: x / Bacteria: x      BNP  I&O's Detail    Daily     Daily     RADIOLOGY & ADDITIONAL STUDIES:

## 2024-01-13 NOTE — ED PROVIDER NOTE - NSICDXPASTSURGICALHX_GEN_ALL_CORE_FT
PAST SURGICAL HISTORY:  Disorder of left rotator cuff     H/O:  section 2x    H/O: hysterectomy 2003    History of cholecystectomy 25 yrs ago

## 2024-01-13 NOTE — ED PROVIDER NOTE - CLINICAL SUMMARY MEDICAL DECISION MAKING FREE TEXT BOX
hx and physical as noted. ddx includes but not limited to acs vs PE. no clinical concern for PNA vs PTX vs esophageal rupture vs aortic pathology at this time. equal and strong radial and DP pulses throughout, tender to palpation of anterior chest wall, pain worse with movement.  low concern for abdominal etiology of symptoms. cards consult, dispo and further interventions penidng. hx and physical as noted. ddx includes but not limited to acs vs PE. no clinical concern for PNA vs PTX vs esophageal rupture vs aortic pathology at this time. equal and strong radial and DP pulses throughout, tender to palpation of anterior chest wall, pain worse with movement.  low concern for abdominal etiology of symptoms. cards consult, dispo and further interventions penidng.    case discussed with Dr. Sarmiento, cardiology attending. patient to be placed in obs for echo

## 2024-01-13 NOTE — ED CDU PROVIDER INITIAL DAY NOTE - ATTENDING APP SHARED VISIT CONTRIBUTION OF CARE
I, Norman Longoria MD, performed the initial face to face bedside interview with this patient regarding history of present illness, review of symptoms and relevant past medical, social and family history.  I completed an independent physical examination.  I was the initial provider who evaluated this patient. I have signed out the follow up of any pending tests (i.e. labs, radiological studies) to the ACP.  I have communicated the patient’s plan of care and disposition with the ACP.

## 2024-01-13 NOTE — ED CDU PROVIDER INITIAL DAY NOTE - OBJECTIVE STATEMENT
64yo F PMHx DM, HTN, HLD, CVA presented to ED c/o mid-sternal chest pain, waxing and waning in intensity x 3 days. Reports pain radiates to neck, right shoulder, axilla and back and is worse with movement but non-exertional. Negative stress and echo ~1 month ago with cardiologist Dr. Paulson. ED workup reviewed, trop 8, 9, d-dimer 207. CTA dissection study negative, revealing incidental findings of 3.0 x 1.3 cm right breast nodule with calcification and gastric fundus thickening/gastritis. Pt evaluated by Dr. Sarmiento cardiology who is recommending trending 3rd trop despite no delta on first two and checking TTE. If unremarkable can be discharged from cardiology perspective. Pt received maalox, pepcid, morphine and is reporting slight improvement in pain on my evaluation. Denies fever, chills, SOB, palpitations, LE edema, vomiting, headache, dizziness, abd pain. 64yo F PMHx DM, HTN, HLD, CVA presented to ED c/o mid-sternal chest pain, waxing and waning in intensity x 3 days. Reports pain radiates to neck, right shoulder, axilla and back and is worse with movement but non-exertional. Negative stress and echo ~1 month ago with cardiologist Dr. Paulsno. ED workup reviewed, trop 8, 9, d-dimer 207. CTA dissection study negative, revealing incidental findings of 3.0 x 1.3 cm right breast nodule with calcification and gastric fundus thickening/gastritis. Pt evaluated by Dr. Sarmiento cardiology who is recommending trending 3rd trop despite no delta on first two and checking TTE. If unremarkable can be discharged from cardiology perspective. Pt received maalox, pepcid, morphine and is reporting slight improvement in pain on my evaluation. Denies fever, chills, SOB, palpitations, LE edema, vomiting, headache, dizziness, abd pain.

## 2024-01-13 NOTE — ED ADULT NURSE NOTE - CAS ELECT INFOMATION PROVIDED
Patient AA&Ox4, resp even/unlabored, ambulatory, steady gait noted, discharged home with all belongings. Med rec and discharge instructions explained and given to pt by KIMBERLY Allred. Patient verbalizes understanding on physician follow up, medication regimen and next dose, s/s of complications and monitoring. No distress noted. VSS, recorded in EMR. Pt in stable condition./DC instructions

## 2024-01-13 NOTE — ED ADULT NURSE NOTE - OBJECTIVE STATEMENT
Pt presents to ED c/o chest pain that is radiating to her back. States that it started earlier today and has been getting worse. Endorses nausea without emesis. PMHx DM2.

## 2024-01-13 NOTE — ED PROVIDER NOTE - PHYSICAL EXAMINATION
PHYSICAL EXAM:   General: appears uncomfortable  HEENT: NC/AT,  airway patent  Cardiovascular: regular rate and rhythm, + S1/S2, no murmurs, rubs, gallops appreciated, chest wall very ttp, no lesions  Respiratory: clear to auscultation bilaterally, good aeration bilaterally, nonlabored respirations  Abdominal: soft, nontender, nondistended, no rebound, guarding or rigidity  Back: no costovertebral tenderness,   Extremities: no LE edema or calf ttp b/l. Radial and DP pulses equal and strong b/l  Neuro: Awake alert interactive  Psychiatric: appropriate mood and affect.   -Surekha Comer MD Attending Physician

## 2024-01-13 NOTE — ED PROVIDER NOTE - PROGRESS NOTE DETAILS
ekg with nsr, LAD, normal intervals, new TWI I, aVL from 2019 patient with recurrent pain ekg unchanged. repeat trop pending cardiology consulted patient with recurrent pain ekg unchanged. repeat trop pending cardiology consulted    patient told she was to avoid ASA in past due to ?blood clot in brain vs bleeding in brain. history unclear. will avoid for now. morphine ordered for pain case discussed with Dr. Sarmiento, cardiology attending. patient to be placed in obs for echo

## 2024-01-13 NOTE — ED PROVIDER NOTE - NS ED MD DISPO DIVISION OBS
Hemigard Intro: Due to skin fragility and wound tension, it was decided to use HEMIGARD adhesive retention suture devices to permit a linear closure. The skin was cleaned and dried for a 6cm distance away from the wound. Excessive hair, if present, was removed to allow for adhesion. [facility]

## 2024-01-14 VITALS
RESPIRATION RATE: 18 BRPM | HEART RATE: 94 BPM | TEMPERATURE: 99 F | OXYGEN SATURATION: 97 % | DIASTOLIC BLOOD PRESSURE: 70 MMHG | SYSTOLIC BLOOD PRESSURE: 139 MMHG

## 2024-01-14 LAB
GLUCOSE BLDC GLUCOMTR-MCNC: 135 MG/DL — HIGH (ref 70–99)
GLUCOSE BLDC GLUCOMTR-MCNC: 135 MG/DL — HIGH (ref 70–99)
GLUCOSE BLDC GLUCOMTR-MCNC: 192 MG/DL — HIGH (ref 70–99)
GLUCOSE BLDC GLUCOMTR-MCNC: 192 MG/DL — HIGH (ref 70–99)
TROPONIN T, HIGH SENSITIVITY RESULT: 11 NG/L — SIGNIFICANT CHANGE UP (ref 0–51)
TROPONIN T, HIGH SENSITIVITY RESULT: 11 NG/L — SIGNIFICANT CHANGE UP (ref 0–51)

## 2024-01-14 PROCEDURE — 93005 ELECTROCARDIOGRAM TRACING: CPT

## 2024-01-14 PROCEDURE — 93306 TTE W/DOPPLER COMPLETE: CPT | Mod: 26

## 2024-01-14 PROCEDURE — 96376 TX/PRO/DX INJ SAME DRUG ADON: CPT | Mod: XU

## 2024-01-14 PROCEDURE — 82962 GLUCOSE BLOOD TEST: CPT

## 2024-01-14 PROCEDURE — 83735 ASSAY OF MAGNESIUM: CPT

## 2024-01-14 PROCEDURE — 99239 HOSP IP/OBS DSCHRG MGMT >30: CPT

## 2024-01-14 PROCEDURE — 73030 X-RAY EXAM OF SHOULDER: CPT

## 2024-01-14 PROCEDURE — 83880 ASSAY OF NATRIURETIC PEPTIDE: CPT

## 2024-01-14 PROCEDURE — 93010 ELECTROCARDIOGRAM REPORT: CPT

## 2024-01-14 PROCEDURE — 93306 TTE W/DOPPLER COMPLETE: CPT

## 2024-01-14 PROCEDURE — 85379 FIBRIN DEGRADATION QUANT: CPT

## 2024-01-14 PROCEDURE — 96374 THER/PROPH/DIAG INJ IV PUSH: CPT | Mod: XU

## 2024-01-14 PROCEDURE — 71046 X-RAY EXAM CHEST 2 VIEWS: CPT

## 2024-01-14 PROCEDURE — 71275 CT ANGIOGRAPHY CHEST: CPT | Mod: MA

## 2024-01-14 PROCEDURE — 96375 TX/PRO/DX INJ NEW DRUG ADDON: CPT | Mod: XU

## 2024-01-14 PROCEDURE — 84484 ASSAY OF TROPONIN QUANT: CPT

## 2024-01-14 PROCEDURE — 74174 CTA ABD&PLVS W/CONTRAST: CPT | Mod: MA

## 2024-01-14 PROCEDURE — 85025 COMPLETE CBC W/AUTO DIFF WBC: CPT

## 2024-01-14 PROCEDURE — 99285 EMERGENCY DEPT VISIT HI MDM: CPT | Mod: 25

## 2024-01-14 PROCEDURE — T1013: CPT

## 2024-01-14 PROCEDURE — G0378: CPT

## 2024-01-14 PROCEDURE — 85610 PROTHROMBIN TIME: CPT

## 2024-01-14 PROCEDURE — 80053 COMPREHEN METABOLIC PANEL: CPT

## 2024-01-14 PROCEDURE — 85730 THROMBOPLASTIN TIME PARTIAL: CPT

## 2024-01-14 PROCEDURE — 36415 COLL VENOUS BLD VENIPUNCTURE: CPT

## 2024-01-14 RX ORDER — METHOCARBAMOL 500 MG/1
2 TABLET, FILM COATED ORAL
Qty: 12 | Refills: 0
Start: 2024-01-14 | End: 2024-01-16

## 2024-01-14 RX ORDER — KETOROLAC TROMETHAMINE 30 MG/ML
15 SYRINGE (ML) INJECTION ONCE
Refills: 0 | Status: DISCONTINUED | OUTPATIENT
Start: 2024-01-14 | End: 2024-01-14

## 2024-01-14 RX ORDER — ACETAMINOPHEN 500 MG
1000 TABLET ORAL ONCE
Refills: 0 | Status: COMPLETED | OUTPATIENT
Start: 2024-01-14 | End: 2024-01-14

## 2024-01-14 RX ORDER — HYDROMORPHONE HYDROCHLORIDE 2 MG/ML
0.5 INJECTION INTRAMUSCULAR; INTRAVENOUS; SUBCUTANEOUS ONCE
Refills: 0 | Status: DISCONTINUED | OUTPATIENT
Start: 2024-01-14 | End: 2024-01-14

## 2024-01-14 RX ADMIN — OXYCODONE HYDROCHLORIDE 5 MILLIGRAM(S): 5 TABLET ORAL at 09:55

## 2024-01-14 RX ADMIN — OXYCODONE HYDROCHLORIDE 5 MILLIGRAM(S): 5 TABLET ORAL at 09:00

## 2024-01-14 RX ADMIN — Medication 15 MILLIGRAM(S): at 05:30

## 2024-01-14 RX ADMIN — METHOCARBAMOL 750 MILLIGRAM(S): 500 TABLET, FILM COATED ORAL at 11:00

## 2024-01-14 RX ADMIN — METHOCARBAMOL 750 MILLIGRAM(S): 500 TABLET, FILM COATED ORAL at 06:23

## 2024-01-14 RX ADMIN — Medication 0: at 08:59

## 2024-01-14 RX ADMIN — LOSARTAN POTASSIUM 100 MILLIGRAM(S): 100 TABLET, FILM COATED ORAL at 06:24

## 2024-01-14 RX ADMIN — Medication 2: at 12:39

## 2024-01-14 RX ADMIN — PANTOPRAZOLE SODIUM 40 MILLIGRAM(S): 20 TABLET, DELAYED RELEASE ORAL at 11:00

## 2024-01-14 RX ADMIN — Medication 15 MILLIGRAM(S): at 04:49

## 2024-01-14 RX ADMIN — Medication 400 MILLIGRAM(S): at 11:00

## 2024-01-14 NOTE — ED CDU PROVIDER DISPOSITION NOTE - PROVIDER TOKENS
PROVIDER:[TOKEN:[[id number]]],PROVIDER:[TOKEN:[[id number]]] PROVIDER:[TOKEN:[[id number]]],PROVIDER:[TOKEN:[[id number]]]

## 2024-01-14 NOTE — ED CDU PROVIDER SUBSEQUENT DAY NOTE - CLINICAL SUMMARY MEDICAL DECISION MAKING FREE TEXT BOX
62yo F PMHx DM, HTN, HLD, CVA presented to ED c/o mid-sternal chest pain, waxing and waning in intensity x 3 days. Reports pain radiates to neck, right shoulder, axilla and back and is worse with movement but non-exertional. Negative stress and echo ~1 month ago with cardiologist Dr. Paulson. ED workup reviewed, trop 8, 9, d-dimer 207. CTA dissection study negative, revealing incidental findings of 3.0 x 1.3 cm right breast nodule with calcification and gastric fundus thickening/gastritis. pt already knows about breast nodule and had area biopsied recently. Pt evaluated by Dr. Sarmiento cardiology who is recommending trending 3rd trop despite no delta on first two and checking TTE. If unremarkable can be discharged from cardiology perspective. pt accepted to observation unit for continued management    - pending echo and cardiology reevaluation

## 2024-01-14 NOTE — ED ADULT NURSE REASSESSMENT NOTE - GENERAL PATIENT STATE
comfortable appearance/improvement verbalized/smiling/interactive
comfortable appearance/cooperative/improvement verbalized
comfortable appearance/improvement verbalized

## 2024-01-14 NOTE — ED CDU PROVIDER SUBSEQUENT DAY NOTE - HISTORY
Patient on and off has chest pain that is reproducible on palpations. repeat the troponin within normal limits. EKG abnormal but without any new changes. pending echo and cardiology reevaluation

## 2024-01-14 NOTE — ED CDU PROVIDER SUBSEQUENT DAY NOTE - ATTENDING APP SHARED VISIT CONTRIBUTION OF CARE
I, Artemio Romero, performed a face to face bedside interview with this patient regarding history of present illness, and completed an independent physical examination. I personally made/approved the management plan and take responsibility for the patient management. I have communicated the patient’s plan of care and disposition with the ACP.  No acute events overnight, chest pain improved  Gen: NAD, well appearing  CV: RRR  Pul: CTA b/l  Abd: Soft, non-distended, non-tender  Neuro: no focal deficits

## 2024-01-14 NOTE — ED CDU PROVIDER DISPOSITION NOTE - CLINICAL COURSE
64 y/o F c/o back and chest pain - CT angio neg for dissection, trops neg, TTE normal, cleared by cardiology

## 2024-01-14 NOTE — ED CDU PROVIDER DISPOSITION NOTE - CARE PROVIDER_API CALL
Nayla Sarmiento Ann  Cardiovascular Disease  [facility] [address]  [place] [address]  Phone: [phone]  Fax: [phone]  Follow Up Time:     Georgina Romero  Obstetrics and Gynecology  [address] [place] [address], [place] [address]  Phone: [phone]  Fax: [phone]  Follow Up Time:    Nayla Sarmiento Ann  Cardiovascular Disease  [facility] [address]  [place] [address]  Phone: [phone]  Fax: [phone]  Follow Up Time:     Georgina Romero  Obstetrics and Gynecology  [address] [place] [address], [place] [address]  Phone: [phone]  Fax: [phone]  Follow Up Time:

## 2024-01-14 NOTE — ED CDU PROVIDER DISPOSITION NOTE - ATTENDING CONTRIBUTION TO CARE
I, Artemio Romero, performed a face to face bedside interview with this patient regarding history of present illness, and completed an independent physical examination. I personally made/approved the management plan and take responsibility for the patient management. I have communicated the patient’s plan of care and disposition with the ACP.  Pt placed on obs for chest pain, trop, ekg and echo wihtout pathology, stable for dc  Gen: NAD, well appearing  CV: RRR  Pul: CTA b/l  Abd: Soft, non-distended, non-tender  Neuro: no focal deficits  Pt improved, stable for dc

## 2024-01-14 NOTE — ED ADULT NURSE REASSESSMENT NOTE - NS ED NURSE REASSESS COMMENT FT1
Assumed care of patient at 07:30 from RN KEITH Hernández. Charting as noted. Patient AA&Ox4, resp even/unlabored, MAEx4, skin warm, dry, intact, presents to ED c/o chest pain. At time of assessment, patient denies pain/discomfort, denies CP/SOB, denies any further complaints or physical symptoms. Patient updated on the plan of care, pending TTE echo and cardiology consult. Stretcher locked in lowest position, IV site flushed w/ NS. No redness, swelling or pain noted to site. Pt remains on CM in NSR, rate 84, SpO2 99% on 2L NC. No signs of acute distress noted, safety maintained.
Assumed care of patient at 19:24 from dayshift RN Adria, charting as noted, patient is awake, calm, RR even and unlabored, denies sob, patient is complaining of pain, received ordered meds by day RN recently, aware of plan of care, iv patent, sinus tachy on cm, provider aware, bed locked in lowest position, call bell placed within reach.
Patient AA&Ox4, resp even/unlabored, ambulatory, steady gait noted, discharged home with all belongings. Med rec and discharge instructions explained and given to pt by KIMBERLY Allred. Patient verbalizes understanding on physician follow up, medication regimen and next dose, s/s of complications and monitoring. No distress noted. VSS, recorded in EMR. Pt in stable condition.
Patient resting comfortably in bed, awaiting TTE echo results. No signs of acute distress or change in mental status noted, all safety measures remain in place, monitoring in progress.
Pt transported to and from TTE echo without incident by hospital transporter. Pt now back in ED, resting comfortably, no acute distress noted. Monitoring in progress.
Tolerated po med well, nsr on cm.
Patient is awake, anxious, reports increase in chest and back pain, denies shoulder/jaw pain, pa made aware, troponin ordered, blood work sent to lab, ekg done at bedside by RN, PA will order meds for pain.

## 2024-01-14 NOTE — ED CDU PROVIDER DISPOSITION NOTE - PATIENT PORTAL LINK FT
You can access the FollowMyHealth Patient Portal offered by [facility] by registering at the following website: [email or web address]. By joining [employer]’s FollowMyHealth portal, you will also be able to view your health information using other applications (apps) compatible with our system. You can access the FollowMyHealth Patient Portal offered by [facility] by registering at the following website: [email or web address]. By joining [employer]’s FollowMyHealth portal, you will also be able to view your health information using other applications (apps) compatible with our system.

## 2024-08-02 ENCOUNTER — APPOINTMENT (OUTPATIENT)
Dept: OTOLARYNGOLOGY | Facility: CLINIC | Age: 64
End: 2024-08-02
